# Patient Record
Sex: MALE | NOT HISPANIC OR LATINO | Employment: OTHER | URBAN - METROPOLITAN AREA
[De-identification: names, ages, dates, MRNs, and addresses within clinical notes are randomized per-mention and may not be internally consistent; named-entity substitution may affect disease eponyms.]

---

## 2023-11-16 ASSESSMENT — DERMATOLOGY QUALITY OF LIFE (QOL) ASSESSMENT
RATE HOW EMOTIONALLY BOTHERED YOU ARE BY YOUR SKIN PROBLEM (FOR EXAMPLE, WORRY, EMBARRASSMENT, FRUSTRATION): 1
WHAT SINGLE SKIN CONDITION LISTED BELOW IS THE PATIENT ANSWERING THE QUALITY-OF-LIFE ASSESSMENT QUESTIONS ABOUT: NONE OF THE ABOVE
RATE HOW BOTHERED YOU ARE BY EFFECTS OF YOUR SKIN PROBLEMS ON YOUR ACTIVITIES (EG, GOING OUT, ACCOMPLISHING WHAT YOU WANT, WORK ACTIVITIES OR YOUR RELATIONSHIPS WITH OTHERS): 2
RATE HOW EMOTIONALLY BOTHERED YOU ARE BY YOUR SKIN PROBLEM (FOR EXAMPLE, WORRY, EMBARRASSMENT, FRUSTRATION): 1
RATE HOW BOTHERED YOU ARE BY SYMPTOMS OF YOUR SKIN PROBLEM (EG, ITCHING, STINGING BURNING, HURTING OR SKIN IRRITATION): 2
RATE HOW BOTHERED YOU ARE BY SYMPTOMS OF YOUR SKIN PROBLEM (EG, ITCHING, STINGING BURNING, HURTING OR SKIN IRRITATION): 2
RATE HOW BOTHERED YOU ARE BY EFFECTS OF YOUR SKIN PROBLEMS ON YOUR ACTIVITIES (EG, GOING OUT, ACCOMPLISHING WHAT YOU WANT, WORK ACTIVITIES OR YOUR RELATIONSHIPS WITH OTHERS): 2
WHAT SINGLE SKIN CONDITION LISTED BELOW IS THE PATIENT ANSWERING THE QUALITY-OF-LIFE ASSESSMENT QUESTIONS ABOUT: NONE OF THE ABOVE

## 2023-11-20 ENCOUNTER — OFFICE VISIT (OUTPATIENT)
Dept: DERMATOLOGY | Facility: CLINIC | Age: 83
End: 2023-11-20
Payer: MEDICARE

## 2023-11-20 DIAGNOSIS — D22.5 MELANOCYTIC NEVUS OF TRUNK: ICD-10-CM

## 2023-11-20 DIAGNOSIS — L82.0 INFLAMED SEBORRHEIC KERATOSIS: ICD-10-CM

## 2023-11-20 DIAGNOSIS — L57.8 DIFFUSE PHOTODAMAGE OF SKIN: ICD-10-CM

## 2023-11-20 DIAGNOSIS — L85.3 XEROSIS CUTIS: ICD-10-CM

## 2023-11-20 DIAGNOSIS — Z85.828 HISTORY OF NONMELANOMA SKIN CANCER: ICD-10-CM

## 2023-11-20 DIAGNOSIS — L82.1 SEBORRHEIC KERATOSIS: ICD-10-CM

## 2023-11-20 DIAGNOSIS — L57.0 ACTINIC KERATOSIS: Primary | ICD-10-CM

## 2023-11-20 DIAGNOSIS — D18.01 HEMANGIOMA OF SKIN: ICD-10-CM

## 2023-11-20 PROCEDURE — 1036F TOBACCO NON-USER: CPT | Performed by: DERMATOLOGY

## 2023-11-20 PROCEDURE — 99214 OFFICE O/P EST MOD 30 MIN: CPT | Performed by: DERMATOLOGY

## 2023-11-20 PROCEDURE — 17110 DESTRUCTION B9 LES UP TO 14: CPT | Performed by: DERMATOLOGY

## 2023-11-20 PROCEDURE — 17004 DESTROY PREMAL LESIONS 15/>: CPT | Performed by: DERMATOLOGY

## 2023-11-20 PROCEDURE — 1159F MED LIST DOCD IN RCRD: CPT | Performed by: DERMATOLOGY

## 2023-11-20 RX ORDER — OMEPRAZOLE 20 MG/1
CAPSULE, DELAYED RELEASE ORAL
COMMUNITY
Start: 2022-08-08 | End: 2024-05-30 | Stop reason: ALTCHOICE

## 2023-11-20 RX ORDER — CHLORTHALIDONE 25 MG/1
12.5 TABLET ORAL
COMMUNITY
Start: 2023-02-24

## 2023-11-20 RX ORDER — ASPIRIN 81 MG/1
1 TABLET ORAL EVERY OTHER DAY
COMMUNITY
Start: 2013-09-12

## 2023-11-20 RX ORDER — FAMOTIDINE 10 MG/1
TABLET ORAL
COMMUNITY

## 2023-11-20 RX ORDER — LORATADINE 10 MG/1
10 TABLET ORAL 2 TIMES DAILY
COMMUNITY

## 2023-11-20 RX ORDER — POLYETHYLENE GLYCOL 3350 17 G/17G
POWDER, FOR SOLUTION ORAL
COMMUNITY
Start: 2022-06-22

## 2023-11-20 RX ORDER — METOPROLOL TARTRATE 50 MG/1
TABLET ORAL
COMMUNITY
Start: 2011-12-16

## 2023-11-20 RX ORDER — FLUTICASONE PROPIONATE 50 MCG
SPRAY, SUSPENSION (ML) NASAL
COMMUNITY
Start: 2020-05-26

## 2023-11-20 RX ORDER — AMMONIUM LACTATE 12 G/100G
LOTION TOPICAL AS NEEDED
Qty: 229 G | Refills: 6 | Status: SHIPPED | OUTPATIENT
Start: 2023-11-20 | End: 2024-04-22 | Stop reason: ALTCHOICE

## 2023-11-20 ASSESSMENT — ITCH NUMERIC RATING SCALE: HOW SEVERE IS YOUR ITCHING?: 0

## 2023-11-20 ASSESSMENT — PATIENT GLOBAL ASSESSMENT (PGA): PATIENT GLOBAL ASSESSMENT: PATIENT GLOBAL ASSESSMENT:  2 - MILD

## 2023-11-20 ASSESSMENT — DERMATOLOGY PATIENT ASSESSMENT
HAVE YOU HAD OR DO YOU HAVE VASCULAR DISEASE: NO
DO YOU USE A TANNING BED: NO
HAVE YOU HAD OR DO YOU HAVE A STAPH INFECTION: NO
DO YOU USE SUNSCREEN: OCCASIONALLY
ARE YOU AN ORGAN TRANSPLANT RECIPIENT: NO

## 2023-11-20 ASSESSMENT — DERMATOLOGY QUALITY OF LIFE (QOL) ASSESSMENT
RATE HOW BOTHERED YOU ARE BY EFFECTS OF YOUR SKIN PROBLEMS ON YOUR ACTIVITIES (EG, GOING OUT, ACCOMPLISHING WHAT YOU WANT, WORK ACTIVITIES OR YOUR RELATIONSHIPS WITH OTHERS): 0 - NEVER BOTHERED
DATE THE QUALITY-OF-LIFE ASSESSMENT WAS COMPLETED: 66798
RATE HOW EMOTIONALLY BOTHERED YOU ARE BY YOUR SKIN PROBLEM (FOR EXAMPLE, WORRY, EMBARRASSMENT, FRUSTRATION): 0 - NEVER BOTHERED
RATE HOW BOTHERED YOU ARE BY SYMPTOMS OF YOUR SKIN PROBLEM (EG, ITCHING, STINGING BURNING, HURTING OR SKIN IRRITATION): 0 - NEVER BOTHERED

## 2023-11-20 NOTE — PROGRESS NOTES
"Subjective     Colin Boswell is a 83 y.o. male who presents for the following: Skin Check.  He notes 4 raised, scaly bumps on his right cheek, which he reports frequently catch on his razor while shaving and get irritated and itch.  They have not changed in any other way, including in size or color, and they do not hurt or bleed.  He denies any other new, changing, or concerning skin lesions; no bleeding, itching, or burning lesions.      Review of Systems:  No other skin or systemic complaints other than what is documented elsewhere in the note.    The following portions of the chart were reviewed this encounter and updated as appropriate:       Skin Cancer History  No skin cancer on file.    Specialty Problems    None      Past Dermatologic / Past Relevant Medical History:    - history of BCC on \"inf cutaneous lip lt\" diagnosed by Dr. Darrell Lancaster on 6/18/21 s/p Mohs surgery by Dr. Zuniga on 8/17/21   - reported history of BCC on left lower leg diagnosed by Dr. Lancaster on 8/17/22 s/p radiation therapy by Dr. Lancaster in September and October 2022, according to the patient  - AKs   - no h/o melanoma    Family History:    No family history of melanoma or skin cancer    Social History:    The patient states he is retired from working as an  for [employer] and lived in [place], [place], for several years     Allergies:  Tree nuts, Amlodipine, Codeine, Banana, Lactose, Red wine extract, and Squash    Current Medications / CAM's:    Current Outpatient Medications:     aspirin 81 mg EC tablet, Take 1 tablet (81 mg) by mouth every other day., Disp: , Rfl:     chlorthalidone (Hygroton) 25 mg tablet, Take 0.5 tablets (12.5 mg) by mouth once daily., Disp: , Rfl:     fluticasone (Flonase) 50 mcg/actuation nasal spray, SHAKE LIQUID AND USE 2 SPRAYS IN EACH NOSTRIL DAILY AT BEDTIME, Disp: , Rfl:     metoprolol tartrate (Lopressor) 50 mg tablet, , Disp: , Rfl:     omeprazole (PriLOSEC) 20 mg DR capsule, , Disp: , " Rfl:     polyethylene glycol (Glycolax, Miralax) 17 gram/dose powder, Take by mouth., Disp: , Rfl:     ammonium lactate (Lac-Hydrin) 12 % lotion, Apply topically if needed for dry skin., Disp: 229 g, Rfl: 6    famotidine (Pepcid) 10 mg tablet, Take by mouth., Disp: , Rfl:     loratadine (Claritin) 10 mg tablet, Take 1 tablet (10 mg) by mouth 2 times a day., Disp: , Rfl:     vitamin D3-vitamin K2 1,250-200 mcg capsule, Take by mouth., Disp: , Rfl:      Objective   Well appearing patient in no apparent distress; mood and affect are within normal limits.    A full examination was performed including scalp, face, eyes, ears, nose, lips, neck, chest, axillae, abdomen, back, bilateral upper extremities, and bilateral lower extremities. All findings within normal limits unless otherwise noted below.    Assessment/Plan   1. Actinic keratosis (16)  Left Forehead (16)  Scattered on the patient's face, there are multiple erythematous, gritty, scaly macules     Actinic Keratoses - scattered face.  The pre-cancerous nature of these lesions and treatment options were discussed with the patient today.  At this time, we recommend treatment with liquid nitrogen cryotherapy.  The patient expressed understanding, is in agreement with this plan, and wishes to proceed with cryotherapy today.    Destr of lesion - Left Forehead  Complexity: simple    Destruction method: cryotherapy    Informed consent: discussed and consent obtained    Lesion destroyed using liquid nitrogen: Yes    Cryotherapy cycles:  1  Outcome: patient tolerated procedure well with no complications    Post-procedure details: wound care instructions given      2. Inflamed seborrheic keratosis (4)  Right Malar Cheek (2), Right Zygomatic Area (2)  On the patient's right cheek, there are 4 similar-appearing erythematous and light brown-colored, hyperkeratotic, stuck-on appearing papules each with a surrounding rim of erythema    Inflamed Seborrheic Keratoses - scattered on  right cheek.  The benign nature of these lesions was discussed with the patient today and reassurance provided.  Given the history the patient provides of frequent irritation and associated symptoms as well as their inflamed appearance on exam today, we offered to treat these 4 lesions with liquid nitrogen cryotherapy.  The patient expressed understanding, is in agreement with this plan, and wishes to proceed with cryotherapy today.    Destr of lesion - Right Malar Cheek (2), Right Zygomatic Area  Complexity: simple    Destruction method: cryotherapy    Informed consent: discussed and consent obtained    Lesion destroyed using liquid nitrogen: Yes    Cryotherapy cycles:  2  Outcome: patient tolerated procedure well with no complications    Post-procedure details: wound care instructions given      Related Medications  ammonium lactate (Lac-Hydrin) 12 % lotion  Apply topically if needed for dry skin.    3. Hemangioma of skin  Scattered on the patient's face, neck, trunk, and extremities, there are multiple small, round, cherry red- to purplish-colored, symmetric, uniform, vascular-appearing macules and papules    Cherry Angiomas - the benign nature of these vascular lesions was discussed with the patient today and reassurance provided.  No treatment is medically indicated for these lesions at this time.    4. Seborrheic keratosis  Scattered on the patient's face, neck, trunk, and extremities, there are multiple tan- to light brown-colored, hyperkeratotic, stuck-on appearing papules of varying size and shape    Seborrheic Keratoses - the benign nature of these lesions was discussed with the patient today and reassurance provided.  No treatment is medically indicated for the noninflamed SKs at this time.    5. Melanocytic nevus of trunk  Scattered on the patient's face, neck, trunk, and extremities, there are multiple small, round- to oval-shaped, brown-pigmented and pink-colored, symmetric, uniform-appearing macules  and dome-shaped papules    Clinically benign- to slightly atypical-appearing nevi - the clinically benign- to slightly atypical-appearing nature of the patient's nevi was discussed with the patient today.  None of the patient's nevi meet threshold for biopsy today.  We emphasized the importance of performing monthly self-skin exams using the ABCDs of monitoring moles, which were reviewed with the patient today, as well as the importance of sun avoidance and sun protection with daily sunscreen use.  The patient expressed understanding and is in agreement with this plan.    6. Xerosis cutis  Diffuse generalized dry, scaly skin.    Xerosis.  We emphasized the importance of dry, sensitive skin care, including the use of a mild soap, such as Dove, and frequent and aggressive moisturization, at least twice daily and immediately following showers or baths, with recommended over-the-counter moisturizing creams, such as Eucerin, Cetaphil, Cerave, or Aveeno, or Vaseline or Aquaphor ointments.  In addition, we recommend topical keratolytic therapy and moisturization with Ammonium Lactate 12% lotion, which the patient was instructed to apply twice daily to the affected areas for moisturization.  The risks, benefits, and side effects of this medication were discussed.  The patient expressed understanding and is in agreement with this plan.    7. History of nonmelanoma skin cancer  On the patient's inferior cutaneous lip left and left lower leg, there are well-healed scars with no evidence of recurrent growth on exam today.     History of basal cell carcinomas and actinic keratoses and photodamage.  There is no evidence of recurrence on exam today.  The signs and symptoms of skin cancer were reviewed and the patient was advised to practice sun protection and sun avoidance, use daily sunscreen, and perform regular self skin exams.  We will have the patient return to our office in 4-6 months for routine follow-up and skin exam, and  the patient was instructed to call our office should the patient notice any new, changing, symptomatic, or otherwise concerning skin lesions before then.  The patient expressed understanding and is in agreement with this plan.    Related Procedures  Follow Up In Dermatology - Established Patient    8. Diffuse photodamage of skin  Photodistributed  Diffuse photodamage with actinic changes with telangiectasia and mottled pigmentation in sun-exposed areas.    Photodamage.  The signs and symptoms of skin cancer were reviewed and the patient was advised to practice sun protection and sun avoidance, use daily sunscreen, and perform regular self skin exams.  Sun protection was discussed, including avoiding the mid-day sun, wearing a sunscreen with SPF at least 50, and stressing the need for reapplication of sunscreen and applying more than they think they need.      I saw and evaluated the patient. I personally obtained the key and critical portions of the history and physical exam or was physically present for key and critical portions performed by the resident/fellow. I reviewed the resident/fellow's documentation and discussed the patient with the resident/fellow. I agree with the resident/fellow's medical decision making as documented in the note.    Miguel Gallagher MD

## 2024-04-22 ENCOUNTER — LAB (OUTPATIENT)
Dept: LAB | Facility: LAB | Age: 84
End: 2024-04-22
Payer: MEDICARE

## 2024-04-22 ENCOUNTER — OFFICE VISIT (OUTPATIENT)
Dept: GASTROENTEROLOGY | Facility: CLINIC | Age: 84
End: 2024-04-22
Payer: MEDICARE

## 2024-04-22 VITALS — OXYGEN SATURATION: 97 % | HEIGHT: 67 IN | HEART RATE: 67 BPM | BODY MASS INDEX: 27.81 KG/M2 | WEIGHT: 177.2 LBS

## 2024-04-22 DIAGNOSIS — R11.0 NAUSEA: ICD-10-CM

## 2024-04-22 DIAGNOSIS — R11.0 NAUSEA: Primary | ICD-10-CM

## 2024-04-22 PROCEDURE — 36415 COLL VENOUS BLD VENIPUNCTURE: CPT

## 2024-04-22 PROCEDURE — 1159F MED LIST DOCD IN RCRD: CPT | Performed by: INTERNAL MEDICINE

## 2024-04-22 PROCEDURE — 1036F TOBACCO NON-USER: CPT | Performed by: INTERNAL MEDICINE

## 2024-04-22 PROCEDURE — 80053 COMPREHEN METABOLIC PANEL: CPT

## 2024-04-22 PROCEDURE — 99214 OFFICE O/P EST MOD 30 MIN: CPT | Performed by: INTERNAL MEDICINE

## 2024-04-22 PROCEDURE — 1160F RVW MEDS BY RX/DR IN RCRD: CPT | Performed by: INTERNAL MEDICINE

## 2024-04-22 RX ORDER — ONDANSETRON 4 MG/1
4 TABLET, FILM COATED ORAL EVERY 8 HOURS PRN
COMMUNITY
Start: 2024-04-22

## 2024-04-22 RX ORDER — ROSUVASTATIN CALCIUM 40 MG/1
40 TABLET, COATED ORAL DAILY
COMMUNITY

## 2024-04-22 RX ORDER — POTASSIUM CHLORIDE 20 MEQ/1
20 TABLET, EXTENDED RELEASE ORAL 2 TIMES DAILY
COMMUNITY
Start: 2024-03-06

## 2024-04-22 RX ORDER — FOLIC ACID 1 MG/1
TABLET ORAL
COMMUNITY

## 2024-04-22 RX ORDER — ASCORBIC ACID 500 MG
TABLET ORAL
COMMUNITY
Start: 2020-12-02

## 2024-04-22 RX ORDER — ACETAMINOPHEN 500 MG
4000 TABLET ORAL
COMMUNITY
Start: 2014-03-13

## 2024-04-22 NOTE — PROGRESS NOTES
"Subjective     History of Present Illness:   Colin Boswell is a 83 y.o. male who presents to GI clinic for week ago woke up with vomiting and diarrhea. Went to ED. Diarrhea stopped but markedly low potassium. CT okay. Has persisted with nausea since then. Off and on \"waves\" of nausea, sometimes after eating. Sleeping a lot. Has been using ondansetron with good relief. He's eating a little more but feels weak. Now feels constipated. Added Colace to his usual Miralax. Small BM last couple days.     Review of Systems  Review of Systems    Social History   reports that he quit smoking about 37 years ago. His smoking use included cigarettes. He has quit using smokeless tobacco. He reports current alcohol use of about 1.0 standard drink of alcohol per week.     Allergies  Allergies   Allergen Reactions    Tree Nuts Anaphylaxis and Shortness of breath     Since a kid    Amlodipine Swelling     Leg edema    Codeine Hallucinations and Unknown    Banana GI Upset    Lactose Unknown    Red Wine Extract GI Upset    Squash GI Upset       Medications  Current Outpatient Medications   Medication Instructions    ammonium lactate (Lac-Hydrin) 12 % lotion Topical, As needed    ascorbic acid (Vitamin C) 500 mg tablet oral    aspirin 81 mg EC tablet 1 tablet, oral, Every other day    chlorthalidone (HYGROTON) 12.5 mg, oral, Daily RT    cholecalciferol (VITAMIN D-3) 4,000 Units, oral, Daily RT    famotidine (Pepcid) 10 mg tablet oral    fluticasone (Flonase) 50 mcg/actuation nasal spray SHAKE LIQUID AND USE 2 SPRAYS IN EACH NOSTRIL DAILY AT BEDTIME    folic acid (Folvite) 1 mg tablet TAKE 1 TABLET BY MOUTH ONCE DAILY. NEEDS NEW PCP FOR FURTHER REFILLS    LACTOBACILLUS RHAMNOSUS GG ORAL oral    loratadine (CLARITIN) 10 mg, oral, 2 times daily    metoprolol tartrate (Lopressor) 50 mg tablet     omeprazole (PriLOSEC) 20 mg DR capsule     ondansetron (ZOFRAN) 4 mg, oral, Every 8 hours PRN    polyethylene glycol (Glycolax, Miralax) 17 " gram/dose powder oral    potassium chloride CR 20 mEq ER tablet 20 mEq, oral, 2 times daily    psyllium (Metamucil) 3.4 gram packet 3 capsules, oral, Daily RT    rosuvastatin (CRESTOR) 40 mg, oral, Daily    vitamin D3-vitamin K2 1,250-200 mcg capsule oral        Objective   Visit Vitals  Pulse 67      Physical Exam  Constitutional:       Appearance: Normal appearance.   HENT:      Mouth/Throat:      Mouth: Mucous membranes are moist.      Pharynx: Oropharynx is clear.   Eyes:      Extraocular Movements: Extraocular movements intact.      Pupils: Pupils are equal, round, and reactive to light.   Cardiovascular:      Rate and Rhythm: Normal rate and regular rhythm.      Heart sounds: No murmur heard.  Pulmonary:      Effort: Pulmonary effort is normal.      Breath sounds: Normal breath sounds.   Abdominal:      General: Abdomen is flat. Bowel sounds are normal.      Palpations: Abdomen is soft. There is no mass.      Tenderness: There is abdominal tenderness (mild LLQ tenderness).   Skin:     General: Skin is warm and dry.   Neurological:      Mental Status: He is alert.   Psychiatric:         Mood and Affect: Mood normal.         Behavior: Behavior normal.         Thought Content: Thought content normal.         Judgment: Judgment normal.               Assessment/Plan   Colin Boswell is a 83 y.o. male who presents to GI clinic for persistent nausea after what sounds like a viral gastroenteritis. Some constipation now, likely from several days not eating. Unlikely persistent infection, electrolyte abnormalities.    Plan:  CMP  Continue advancing diet  Let me know if symptoms fail to fully resolve over next week to 10 days.      Shaq Ontiveros MD

## 2024-04-23 DIAGNOSIS — E87.6 HYPOKALEMIA: Primary | ICD-10-CM

## 2024-04-23 DIAGNOSIS — R74.8 ABNORMAL LIVER ENZYMES: ICD-10-CM

## 2024-04-23 LAB
ALBUMIN SERPL BCP-MCNC: 4.4 G/DL (ref 3.4–5)
ALP SERPL-CCNC: 58 U/L (ref 33–136)
ALT SERPL W P-5'-P-CCNC: 45 U/L (ref 10–52)
ANION GAP SERPL CALC-SCNC: 15 MMOL/L (ref 10–20)
AST SERPL W P-5'-P-CCNC: 58 U/L (ref 9–39)
BILIRUB SERPL-MCNC: 0.6 MG/DL (ref 0–1.2)
BUN SERPL-MCNC: 20 MG/DL (ref 6–23)
CALCIUM SERPL-MCNC: 9.6 MG/DL (ref 8.6–10.6)
CHLORIDE SERPL-SCNC: 99 MMOL/L (ref 98–107)
CO2 SERPL-SCNC: 30 MMOL/L (ref 21–32)
CREAT SERPL-MCNC: 1.29 MG/DL (ref 0.5–1.3)
EGFRCR SERPLBLD CKD-EPI 2021: 55 ML/MIN/1.73M*2
GLUCOSE SERPL-MCNC: 90 MG/DL (ref 74–99)
POTASSIUM SERPL-SCNC: 3.9 MMOL/L (ref 3.5–5.3)
PROT SERPL-MCNC: 7.3 G/DL (ref 6.4–8.2)
SODIUM SERPL-SCNC: 140 MMOL/L (ref 136–145)

## 2024-04-23 NOTE — RESULT ENCOUNTER NOTE
Creatinine up slightly likely consistent with a dehydration of this recent illness.  Will manage expectantly.

## 2024-05-08 ENCOUNTER — LAB (OUTPATIENT)
Dept: LAB | Facility: LAB | Age: 84
End: 2024-05-08
Payer: MEDICARE

## 2024-05-08 DIAGNOSIS — R74.8 ABNORMAL LIVER ENZYMES: ICD-10-CM

## 2024-05-08 DIAGNOSIS — R79.89 ABNORMAL LFTS: ICD-10-CM

## 2024-05-08 DIAGNOSIS — E87.6 HYPOKALEMIA: ICD-10-CM

## 2024-05-08 PROCEDURE — 86705 HEP B CORE ANTIBODY IGM: CPT

## 2024-05-08 PROCEDURE — 87340 HEPATITIS B SURFACE AG IA: CPT

## 2024-05-08 PROCEDURE — 86803 HEPATITIS C AB TEST: CPT

## 2024-05-08 PROCEDURE — 36415 COLL VENOUS BLD VENIPUNCTURE: CPT

## 2024-05-08 PROCEDURE — 80053 COMPREHEN METABOLIC PANEL: CPT

## 2024-05-09 DIAGNOSIS — R79.89 ABNORMAL LFTS: Primary | ICD-10-CM

## 2024-05-09 LAB
ALBUMIN SERPL BCP-MCNC: 4.2 G/DL (ref 3.4–5)
ALP SERPL-CCNC: 49 U/L (ref 33–136)
ALT SERPL W P-5'-P-CCNC: 32 U/L (ref 10–52)
ANION GAP SERPL CALC-SCNC: 13 MMOL/L (ref 10–20)
AST SERPL W P-5'-P-CCNC: 41 U/L (ref 9–39)
BILIRUB SERPL-MCNC: 0.6 MG/DL (ref 0–1.2)
BUN SERPL-MCNC: 20 MG/DL (ref 6–23)
CALCIUM SERPL-MCNC: 9.1 MG/DL (ref 8.6–10.6)
CHLORIDE SERPL-SCNC: 102 MMOL/L (ref 98–107)
CO2 SERPL-SCNC: 29 MMOL/L (ref 21–32)
CREAT SERPL-MCNC: 1.18 MG/DL (ref 0.5–1.3)
EGFRCR SERPLBLD CKD-EPI 2021: 61 ML/MIN/1.73M*2
GLUCOSE SERPL-MCNC: 84 MG/DL (ref 74–99)
HBV CORE IGM SER QL: NONREACTIVE
HBV SURFACE AG SERPL QL IA: NONREACTIVE
HCV AB SER QL: NONREACTIVE
POTASSIUM SERPL-SCNC: 4.1 MMOL/L (ref 3.5–5.3)
PROT SERPL-MCNC: 7 G/DL (ref 6.4–8.2)
SODIUM SERPL-SCNC: 140 MMOL/L (ref 136–145)

## 2024-05-09 NOTE — RESULT ENCOUNTER NOTE
Still with some postprandial nausea and bloating.  Feels he's slowly improving but not back to normal.  Will check HBsAg, HBcAb, HCV antibody, CMV IgM.  Recheck LFT 2 weeks.

## 2024-05-20 ENCOUNTER — OFFICE VISIT (OUTPATIENT)
Dept: DERMATOLOGY | Facility: CLINIC | Age: 84
End: 2024-05-20
Payer: MEDICARE

## 2024-05-20 DIAGNOSIS — D22.9 MELANOCYTIC NEVUS, UNSPECIFIED LOCATION: ICD-10-CM

## 2024-05-20 DIAGNOSIS — L82.0 INFLAMED SEBORRHEIC KERATOSIS: ICD-10-CM

## 2024-05-20 DIAGNOSIS — Z85.828 HISTORY OF NONMELANOMA SKIN CANCER: ICD-10-CM

## 2024-05-20 DIAGNOSIS — L82.1 SEBORRHEIC KERATOSIS: ICD-10-CM

## 2024-05-20 DIAGNOSIS — L73.9 FOLLICULITIS: ICD-10-CM

## 2024-05-20 DIAGNOSIS — L57.8 DIFFUSE PHOTODAMAGE OF SKIN: ICD-10-CM

## 2024-05-20 DIAGNOSIS — L81.4 LENTIGO: ICD-10-CM

## 2024-05-20 DIAGNOSIS — D48.5 NEOPLASM OF UNCERTAIN BEHAVIOR OF SKIN: Primary | ICD-10-CM

## 2024-05-20 DIAGNOSIS — L57.0 ACTINIC KERATOSIS: ICD-10-CM

## 2024-05-20 PROCEDURE — 17004 DESTROY PREMAL LESIONS 15/>: CPT | Performed by: DERMATOLOGY

## 2024-05-20 PROCEDURE — 99214 OFFICE O/P EST MOD 30 MIN: CPT | Performed by: DERMATOLOGY

## 2024-05-20 PROCEDURE — 1160F RVW MEDS BY RX/DR IN RCRD: CPT | Performed by: DERMATOLOGY

## 2024-05-20 PROCEDURE — 11301 SHAVE SKIN LESION 0.6-1.0 CM: CPT | Performed by: DERMATOLOGY

## 2024-05-20 PROCEDURE — 1159F MED LIST DOCD IN RCRD: CPT | Performed by: DERMATOLOGY

## 2024-05-20 PROCEDURE — 17110 DESTRUCTION B9 LES UP TO 14: CPT | Performed by: DERMATOLOGY

## 2024-05-20 RX ORDER — CLINDAMYCIN PHOSPHATE 10 UG/ML
LOTION TOPICAL DAILY
Qty: 60 ML | Refills: 11 | Status: CANCELLED | OUTPATIENT
Start: 2024-05-20 | End: 2025-05-20

## 2024-05-20 RX ORDER — CLINDAMYCIN PHOSPHATE 10 UG/ML
LOTION TOPICAL DAILY
Qty: 60 ML | Refills: 11 | Status: SHIPPED | OUTPATIENT
Start: 2024-05-20 | End: 2025-05-20

## 2024-05-20 ASSESSMENT — DERMATOLOGY QUALITY OF LIFE (QOL) ASSESSMENT
RATE HOW EMOTIONALLY BOTHERED YOU ARE BY YOUR SKIN PROBLEM (FOR EXAMPLE, WORRY, EMBARRASSMENT, FRUSTRATION): 0 - NEVER BOTHERED
RATE HOW BOTHERED YOU ARE BY SYMPTOMS OF YOUR SKIN PROBLEM (EG, ITCHING, STINGING BURNING, HURTING OR SKIN IRRITATION): 1
RATE HOW BOTHERED YOU ARE BY EFFECTS OF YOUR SKIN PROBLEMS ON YOUR ACTIVITIES (EG, GOING OUT, ACCOMPLISHING WHAT YOU WANT, WORK ACTIVITIES OR YOUR RELATIONSHIPS WITH OTHERS): 0 - NEVER BOTHERED
RATE HOW EMOTIONALLY BOTHERED YOU ARE BY YOUR SKIN PROBLEM (FOR EXAMPLE, WORRY, EMBARRASSMENT, FRUSTRATION): 0 - NEVER BOTHERED
RATE HOW BOTHERED YOU ARE BY EFFECTS OF YOUR SKIN PROBLEMS ON YOUR ACTIVITIES (EG, GOING OUT, ACCOMPLISHING WHAT YOU WANT, WORK ACTIVITIES OR YOUR RELATIONSHIPS WITH OTHERS): 0 - NEVER BOTHERED
RATE HOW BOTHERED YOU ARE BY SYMPTOMS OF YOUR SKIN PROBLEM (EG, ITCHING, STINGING BURNING, HURTING OR SKIN IRRITATION): 1

## 2024-05-20 ASSESSMENT — PATIENT GLOBAL ASSESSMENT (PGA): WHAT IS THE PGA: PATIENT GLOBAL ASSESSMENT:  1 - CLEAR

## 2024-05-20 NOTE — PROGRESS NOTES
"Subjective     Colin Boswell is a 83 y.o. male who presents for the following: Skin Exam.  He notes a raised, scaly bump on the back right side of his neck, which has been itching recently, especially when it catches on his necklace.  It has not changed in any other way, including in size, shape, or color, and it does not hurt or bleed.  He also notes recent pimple breakouts on his abdomen.  He denies any other new, changing, or concerning skin lesions since his last visit; no bleeding, itching, or burning lesions.      Review of Systems:  No other skin or systemic complaints other than what is documented elsewhere in the note.    The following portions of the chart were reviewed this encounter and updated as appropriate:       Skin Cancer History  No skin cancer on file.    Specialty Problems    None      Past Dermatologic / Past Relevant Medical History:    - history of BCC on \"inf cutaneous lip lt\" diagnosed by Dr. Darrell Lancaster on 6/18/21 s/p Mohs surgery by Dr. Zuniga on 8/17/21   - reported history of BCC on left lower leg diagnosed by Dr. Lancaster on 8/17/22 s/p radiation therapy by Dr. Lancaster in September and October 2022, according to the patient  - AKs   - no h/o melanoma    Family History:    No family history of melanoma or skin cancer    Social History:    The patient states he is retired from working as an  for [employer] and lived in [place], [place], for several years     Allergies:  Tree nut, Tree nuts, Sulfamethoxazole-trimethoprim, Acetaminophen, Amlodipine, Ciprofloxacin, Codeine, Morphine, Oxycodone-acetaminophen, Propoxyphene, Propoxyphene n-acetaminophen, Banana, Lactose, Levofloxacin, Red wine extract, and Squash    Current Medications / CAM's:    Current Outpatient Medications:     ascorbic acid (Vitamin C) 500 mg tablet, Take by mouth., Disp: , Rfl:     aspirin 81 mg EC tablet, Take 1 tablet (81 mg) by mouth every other day., Disp: , Rfl:     chlorthalidone (Hygroton) 25 mg " tablet, Take 0.5 tablets (12.5 mg) by mouth once daily., Disp: , Rfl:     cholecalciferol (Vitamin D-3) 50 mcg (2,000 unit) capsule, Take 2 capsules (100 mcg) by mouth once daily., Disp: , Rfl:     clindamycin (Cleocin T) 1 % lotion, Apply topically once daily. 60g, Disp: 60 mL, Rfl: 11    famotidine (Pepcid) 10 mg tablet, Take by mouth., Disp: , Rfl:     fluticasone (Flonase) 50 mcg/actuation nasal spray, SHAKE LIQUID AND USE 2 SPRAYS IN EACH NOSTRIL DAILY AT BEDTIME, Disp: , Rfl:     folic acid (Folvite) 1 mg tablet, TAKE 1 TABLET BY MOUTH ONCE DAILY. NEEDS NEW PCP FOR FURTHER REFILLS, Disp: , Rfl:     LACTOBACILLUS RHAMNOSUS GG ORAL, Take by mouth., Disp: , Rfl:     loratadine (Claritin) 10 mg tablet, Take 1 tablet (10 mg) by mouth 2 times a day., Disp: , Rfl:     metoprolol tartrate (Lopressor) 50 mg tablet, , Disp: , Rfl:     omeprazole (PriLOSEC) 20 mg DR capsule, , Disp: , Rfl:     ondansetron (Zofran) 4 mg tablet, Take 1 tablet (4 mg) by mouth every 8 hours if needed., Disp: , Rfl:     polyethylene glycol (Glycolax, Miralax) 17 gram/dose powder, Take by mouth., Disp: , Rfl:     potassium chloride CR 20 mEq ER tablet, Take 1 tablet (20 mEq) by mouth twice a day., Disp: , Rfl:     psyllium (Metamucil) 3.4 gram packet, Take 3 capsules by mouth once daily., Disp: , Rfl:     rosuvastatin (Crestor) 40 mg tablet, Take 1 tablet (40 mg) by mouth once daily., Disp: , Rfl:     vitamin D3-vitamin K2 1,250-200 mcg capsule, Take by mouth., Disp: , Rfl:      Objective   Well appearing patient in no apparent distress; mood and affect are within normal limits.    A full examination was performed including scalp, face, eyes, ears, nose, lips, neck, chest, axillae, abdomen, back, bilateral upper extremities, and bilateral lower extremities. All findings within normal limits unless otherwise noted below.    Assessment/Plan   1. Neoplasm of uncertain behavior of skin  Right posterior mid arm  5 mm dark brown pigmented,  asymmetric macule with an asymmetric pigment network and irregular borders               Shave removal    Lesion length (cm):  0.5  Lesion width (cm):  0.5  Margin per side (cm):  0.2  Lesion diameter (cm):  0.9  Informed consent: discussed and consent obtained    Timeout: patient name, date of birth, surgical site, and procedure verified    Procedure prep:  Patient was prepped and draped  Anesthesia: the lesion was anesthetized in a standard fashion    Anesthetic:  1% lidocaine w/ epinephrine 1-100,000 local infiltration  Instrument used: flexible razor blade    Hemostasis achieved with: aluminum chloride    Outcome: patient tolerated procedure well    Post-procedure details: sterile dressing applied and wound care instructions given    Dressing type: bandage and petrolatum      Specimen 1 - Dermatopathology- DERM LAB  Differential Diagnosis: r/o DN  Check Margins Yes/No?:    Comments:    Dermpath Lab: Routine Histopathology (formalin-fixed tissue)    2. Inflamed seborrheic keratosis  Neck - Posterior  On the patient's right posterior inferior neck, there is a 1 cm erythematous and light brown-colored, hyperkeratotic, stuck-on appearing papule with a surrounding rim of erythema    Inflamed Seborrheic Keratosis - right posterior inferior neck.  The benign nature of this lesion was discussed with the patient today and reassurance provided.  Given the history the patient provides of frequent irritation and associated symptoms as well as its inflamed appearance on exam today, we offered to treat this lesion with liquid nitrogen cryotherapy.  The patient expressed understanding, is in agreement with this plan, and wishes to proceed with cryotherapy today.    Destr of lesion - Neck - Posterior  Complexity: simple    Destruction method: cryotherapy    Informed consent: discussed and consent obtained    Lesion destroyed using liquid nitrogen: Yes    Cryotherapy cycles:  2  Outcome: patient tolerated procedure well with no  complications    Post-procedure details: wound care instructions given      3. Actinic keratosis (16)  Head - Anterior (Face) (16)  Scattered on the patient's face and bilateral ears, there are multiple erythematous, gritty, scaly macules     Actinic Keratoses - scattered on face and bilateral ears.  The pre-cancerous nature of these lesions and treatment options were discussed with the patient today.  At this time, we recommend treatment with liquid nitrogen cryotherapy.  The patient expressed understanding, is in agreement with this plan, and wishes to proceed with cryotherapy today.    Destr of lesion - Head - Anterior (Face)  Complexity: simple    Destruction method: cryotherapy    Informed consent: discussed and consent obtained    Lesion destroyed using liquid nitrogen: Yes    Cryotherapy cycles:  1  Outcome: patient tolerated procedure well with no complications    Post-procedure details: wound care instructions given      4. Folliculitis  Right Breast  Scattered on the patient's abdomen, there are several follicular-based erythematous, inflammatory papules and pustules    Folliculitis -flare on abdomen.  The bacterial nature of this condition and treatment options were discussed with the patient today.  At this time, we recommend topical antibiotic therapy with Clindamycin 1% lotion, which the patient was instructed to apply twice daily to the affected areas or up to 3-4 times per day as needed for active lesions.  The risks, benefits, and side effects of this medication were discussed.  The patient expressed understanding and is in agreement with this plan.    clindamycin (Cleocin T) 1 % lotion - Right Breast  Apply topically once daily. 60g    5. Melanocytic nevus, unspecified location  Scattered on the patient's face, neck, trunk, and extremities, there are multiple small, round- to oval-shaped, brown-pigmented and pink-colored, symmetric, uniform-appearing macules and dome-shaped papules    Clinically  benign- to slightly atypical-appearing nevi - the clinically benign- to slightly atypical-appearing nature of the patient's nevi was discussed with the patient today.  None of the patient's nevi, with the exception of the 1 noted above, meet threshold for biopsy today.  We emphasized the importance of performing monthly self-skin exams using the ABCDs of monitoring moles, which were reviewed with the patient today and an informational hand-out provided.  We also emphasized the importance of sun avoidance and sun protection with daily sunscreen use.  The patient expressed understanding and is in agreement with this plan.    6. Lentigo  Multiple tan- to light brown-colored, round- to oval-shaped, symmetric and uniform-appearing macules and small patches consistent with lentigines scattered in sun-exposed areas.    Solar Lentigines and photodamage.  The clinically benign-appearing nature of these lesions and their relation to chronic sun exposure were discussed with the patient today and reassurance provided.  None of these lesions meet threshold for biopsy today, and thus no treatment is medically indicated for these lesions at this time.  The signs and symptoms of skin cancer were reviewed and the patient was advised to practice sun protection and sun avoidance, use daily sunscreen, and perform regular self skin exams.  The patient was instructed to monitor these lesions for any changes, such as in size, shape, or color, or associated symptoms and to call our office to schedule a return visit for re-evaluation if any such changes or symptoms are noticed in the future.  The patient expressed understanding and is in agreement with this plan.    7. Seborrheic keratosis  Scattered on the patient's face, neck, trunk, and extremities, there are multiple tan- to light brown-colored, hyperkeratotic, stuck-on appearing papules of varying size and shape    Seborrheic Keratoses - the benign nature of these lesions was discussed  with the patient today and reassurance provided.  No treatment is medically indicated for the non-inflamed SKs at this time.    8. History of nonmelanoma skin cancer  On the patient's inferior cutaneous lip lt and left lower leg, there are well-healed scars with no evidence of recurrent growth on exam today.    History of basal cell carcinomas and actinic keratoses and photodamage.  There is no evidence of recurrence on exam today.  The signs and symptoms of skin cancer were reviewed and the patient was advised to practice sun protection and sun avoidance, use daily sunscreen, and perform regular self skin exams.  We will have the patient return to our office in 4-6 months, pending the above biopsy result, for routine follow-up and skin exam, and the patient was instructed to call our office should the patient notice any new, changing, symptomatic, or otherwise concerning skin lesions before then.  The patient expressed understanding and is in agreement with this plan.    Related Procedures  Follow Up In Dermatology - Established Patient  Follow Up In Dermatology - Established Patient    9. Diffuse photodamage of skin  Diffuse photodamage with actinic changes with telangiectasia and mottled pigmentation in sun-exposed areas.    Photodamage.  The signs and symptoms of skin cancer were reviewed and the patient was advised to practice sun protection and sun avoidance, use daily sunscreen, and perform regular self skin exams.  Sun protection was discussed, including avoiding the mid-day sun, wearing a sunscreen with SPF at least 50, and stressing the need for reapplication of sunscreen and applying more than they think they need.        Sandy Cavazos MD      I saw and evaluated the patient. I personally obtained the key and critical portions of the history and physical exam or was physically present for key and critical portions performed by the resident/fellow. I reviewed the resident/fellow's documentation and  discussed the patient with the resident/fellow. I agree with the resident/fellow's medical decision making as documented in the note.    Miguel Gallagher MD

## 2024-05-22 LAB
LABORATORY COMMENT REPORT: NORMAL
PATH REPORT.FINAL DX SPEC: NORMAL
PATH REPORT.GROSS SPEC: NORMAL
PATH REPORT.MICROSCOPIC SPEC OTHER STN: NORMAL
PATH REPORT.RELEVANT HX SPEC: NORMAL
PATH REPORT.TOTAL CANCER: NORMAL

## 2024-05-23 ENCOUNTER — LAB (OUTPATIENT)
Dept: LAB | Facility: LAB | Age: 84
End: 2024-05-23
Payer: MEDICARE

## 2024-05-23 DIAGNOSIS — R79.89 ABNORMAL LFTS: ICD-10-CM

## 2024-05-23 DIAGNOSIS — R74.8 ABNORMAL LIVER ENZYMES: Primary | ICD-10-CM

## 2024-05-23 DIAGNOSIS — R74.8 ABNORMAL LIVER ENZYMES: ICD-10-CM

## 2024-05-23 LAB
A1AT SERPL NEPH-MCNC: 170 MG/DL (ref 84–218)
ALBUMIN SERPL BCP-MCNC: 4 G/DL (ref 3.4–5)
ALP SERPL-CCNC: 53 U/L (ref 33–136)
ALT SERPL W P-5'-P-CCNC: 29 U/L (ref 10–52)
ANION GAP SERPL CALC-SCNC: 13 MMOL/L (ref 10–20)
AST SERPL W P-5'-P-CCNC: 42 U/L (ref 9–39)
BILIRUB SERPL-MCNC: 0.6 MG/DL (ref 0–1.2)
BUN SERPL-MCNC: 18 MG/DL (ref 6–23)
CALCIUM SERPL-MCNC: 9.3 MG/DL (ref 8.6–10.6)
CHLORIDE SERPL-SCNC: 102 MMOL/L (ref 98–107)
CO2 SERPL-SCNC: 30 MMOL/L (ref 21–32)
CREAT SERPL-MCNC: 1.08 MG/DL (ref 0.5–1.3)
EGFRCR SERPLBLD CKD-EPI 2021: 68 ML/MIN/1.73M*2
FERRITIN SERPL-MCNC: 82 NG/ML (ref 20–300)
GLUCOSE SERPL-MCNC: 105 MG/DL (ref 74–99)
IRON SATN MFR SERPL: 25 % (ref 25–45)
IRON SERPL-MCNC: 96 UG/DL (ref 35–150)
POTASSIUM SERPL-SCNC: 3.5 MMOL/L (ref 3.5–5.3)
PROT SERPL-MCNC: 7.2 G/DL (ref 6.4–8.2)
SODIUM SERPL-SCNC: 141 MMOL/L (ref 136–145)
TIBC SERPL-MCNC: 381 UG/DL (ref 240–445)
UIBC SERPL-MCNC: 285 UG/DL (ref 110–370)

## 2024-05-23 PROCEDURE — 86645 CMV ANTIBODY IGM: CPT

## 2024-05-23 PROCEDURE — 86038 ANTINUCLEAR ANTIBODIES: CPT

## 2024-05-23 PROCEDURE — 86381 MITOCHONDRIAL ANTIBODY EACH: CPT

## 2024-05-23 PROCEDURE — 83550 IRON BINDING TEST: CPT

## 2024-05-23 PROCEDURE — 86015 ACTIN ANTIBODY EACH: CPT

## 2024-05-23 PROCEDURE — 82103 ALPHA-1-ANTITRYPSIN TOTAL: CPT

## 2024-05-23 PROCEDURE — 36415 COLL VENOUS BLD VENIPUNCTURE: CPT

## 2024-05-23 PROCEDURE — 83540 ASSAY OF IRON: CPT

## 2024-05-23 PROCEDURE — 80053 COMPREHEN METABOLIC PANEL: CPT

## 2024-05-23 PROCEDURE — 82728 ASSAY OF FERRITIN: CPT

## 2024-05-26 LAB — CMV IGM SERPL-ACNC: <8 AU/ML

## 2024-05-28 LAB
ANA SER QL HEP2 SUBST: NEGATIVE
MITOCHONDRIA AB SER QL IF: NEGATIVE
SMOOTH MUSCLE AB SER QL IF: NEGATIVE

## 2024-05-30 ENCOUNTER — OFFICE VISIT (OUTPATIENT)
Dept: GASTROENTEROLOGY | Facility: CLINIC | Age: 84
End: 2024-05-30
Payer: MEDICARE

## 2024-05-30 VITALS — OXYGEN SATURATION: 98 % | WEIGHT: 177 LBS | HEART RATE: 59 BPM | BODY MASS INDEX: 27.78 KG/M2 | HEIGHT: 67 IN

## 2024-05-30 DIAGNOSIS — R74.8 ABNORMAL LIVER ENZYMES: Primary | ICD-10-CM

## 2024-05-30 DIAGNOSIS — R19.5 CHANGE IN STOOL: ICD-10-CM

## 2024-05-30 PROCEDURE — 1160F RVW MEDS BY RX/DR IN RCRD: CPT | Performed by: INTERNAL MEDICINE

## 2024-05-30 PROCEDURE — 1036F TOBACCO NON-USER: CPT | Performed by: INTERNAL MEDICINE

## 2024-05-30 PROCEDURE — 1159F MED LIST DOCD IN RCRD: CPT | Performed by: INTERNAL MEDICINE

## 2024-05-30 PROCEDURE — 99214 OFFICE O/P EST MOD 30 MIN: CPT | Performed by: INTERNAL MEDICINE

## 2024-05-30 NOTE — PROGRESS NOTES
"Subjective     History of Present Illness:   Colin Boswell is a 83 y.o. male who presents to GI clinic for nausea is now resolved. Was able to stop the omeprazole.   BM now okay taking both the Miralax and a stool softener.  Still sometimes some \"indigestion\" after a BM that is brief and transient.    Occasional \"acid reflux\" after tomato sauce. None for the last couple weeks.    Rare episodes of feeling food doesn't go down when he tries to swallow and then goes down with a sip of water. Then no problem for weeks.    Review of Systems  Review of Systems    Social History   reports that he quit smoking about 37 years ago. His smoking use included cigarettes. He has quit using smokeless tobacco. He reports current alcohol use of about 1.0 standard drink of alcohol per week.     Allergies  Allergies   Allergen Reactions    Tree Nut Anaphylaxis    Tree Nuts Anaphylaxis and Shortness of breath     Since a kid    Sulfamethoxazole-Trimethoprim GI Upset    Acetaminophen Unknown    Amlodipine Swelling     Leg edema    Ciprofloxacin GI Upset    Codeine Hallucinations and Unknown    Morphine Unknown    Oxycodone-Acetaminophen Hallucinations    Propoxyphene Unknown    Propoxyphene N-Acetaminophen Unknown    Banana GI Upset    Lactose Unknown    Levofloxacin GI Upset    Red Wine Extract GI Upset    Squash GI Upset       Medications  Current Outpatient Medications   Medication Instructions    ascorbic acid (Vitamin C) 500 mg tablet oral    aspirin 81 mg EC tablet 1 tablet, oral, Every other day    chlorthalidone (HYGROTON) 12.5 mg, oral, Daily RT    cholecalciferol (VITAMIN D-3) 4,000 Units, oral, Daily RT    clindamycin (Cleocin T) 1 % lotion Topical, Daily, 60g    famotidine (Pepcid) 10 mg tablet oral    fluticasone (Flonase) 50 mcg/actuation nasal spray SHAKE LIQUID AND USE 2 SPRAYS IN EACH NOSTRIL DAILY AT BEDTIME    folic acid (Folvite) 1 mg tablet TAKE 1 TABLET BY MOUTH ONCE DAILY. NEEDS NEW PCP FOR FURTHER REFILLS    " "LACTOBACILLUS RHAMNOSUS GG ORAL oral    loratadine (CLARITIN) 10 mg, oral, 2 times daily    metoprolol tartrate (Lopressor) 50 mg tablet     omeprazole (PriLOSEC) 20 mg DR capsule     ondansetron (ZOFRAN) 4 mg, oral, Every 8 hours PRN    polyethylene glycol (Glycolax, Miralax) 17 gram/dose powder oral    potassium chloride CR 20 mEq ER tablet 20 mEq, oral, 2 times daily    psyllium (Metamucil) 3.4 gram packet 3 capsules, oral, Daily RT    rosuvastatin (CRESTOR) 40 mg, oral, Daily    vitamin D3-vitamin K2 1,250-200 mcg capsule oral        Objective   Visit Vitals  Pulse 59      Physical Exam  Constitutional:       Appearance: Normal appearance. He is normal weight.   HENT:      Head: Normocephalic and atraumatic.      Mouth/Throat:      Mouth: Mucous membranes are dry.      Pharynx: Oropharynx is clear.   Eyes:      Extraocular Movements: Extraocular movements intact.      Pupils: Pupils are equal, round, and reactive to light.   Neurological:      General: No focal deficit present.      Mental Status: He is alert.   Psychiatric:         Mood and Affect: Mood normal.         Thought Content: Thought content normal.         Judgment: Judgment normal.                     No lab exists for component: \"LABALBU\"        Assessment/Plan   Colin Boswell is a 83 y.o. male who presents to GI clinic for   Resolving post-infectious IBS.  Slightly elevated AST with negative serologies.  Mild intermittent dysphagia. ? Motility. ? Zenkers's    Plan:  Let me know in a month.  Will recheck LFT  If still elevated will do U/S liver  If still any episodes dysphagia will do barium swallow..      Shaq Ontiveros MD         "

## 2024-06-27 ENCOUNTER — LAB (OUTPATIENT)
Dept: LAB | Facility: LAB | Age: 84
End: 2024-06-27
Payer: MEDICARE

## 2024-06-27 DIAGNOSIS — R74.8 ABNORMAL LIVER ENZYMES: ICD-10-CM

## 2024-06-27 LAB
ALBUMIN SERPL BCP-MCNC: 4.2 G/DL (ref 3.4–5)
ALP SERPL-CCNC: 52 U/L (ref 33–136)
ALT SERPL W P-5'-P-CCNC: 26 U/L (ref 10–52)
ANION GAP SERPL CALC-SCNC: 15 MMOL/L (ref 10–20)
AST SERPL W P-5'-P-CCNC: 34 U/L (ref 9–39)
BILIRUB SERPL-MCNC: 0.6 MG/DL (ref 0–1.2)
BUN SERPL-MCNC: 17 MG/DL (ref 6–23)
CALCIUM SERPL-MCNC: 9.4 MG/DL (ref 8.6–10.6)
CHLORIDE SERPL-SCNC: 101 MMOL/L (ref 98–107)
CO2 SERPL-SCNC: 30 MMOL/L (ref 21–32)
CREAT SERPL-MCNC: 1.05 MG/DL (ref 0.5–1.3)
EGFRCR SERPLBLD CKD-EPI 2021: 70 ML/MIN/1.73M*2
GLUCOSE SERPL-MCNC: 93 MG/DL (ref 74–99)
POTASSIUM SERPL-SCNC: 3.5 MMOL/L (ref 3.5–5.3)
PROT SERPL-MCNC: 7.3 G/DL (ref 6.4–8.2)
SODIUM SERPL-SCNC: 142 MMOL/L (ref 136–145)

## 2024-06-27 PROCEDURE — 80053 COMPREHEN METABOLIC PANEL: CPT

## 2024-06-27 PROCEDURE — 36415 COLL VENOUS BLD VENIPUNCTURE: CPT

## 2024-07-01 ENCOUNTER — APPOINTMENT (OUTPATIENT)
Dept: GASTROENTEROLOGY | Facility: CLINIC | Age: 84
End: 2024-07-01
Payer: MEDICARE

## 2024-07-01 VITALS — HEART RATE: 60 BPM | HEIGHT: 67 IN | BODY MASS INDEX: 27.18 KG/M2 | WEIGHT: 173.2 LBS | OXYGEN SATURATION: 97 %

## 2024-07-01 DIAGNOSIS — R11.0 NAUSEA: Primary | ICD-10-CM

## 2024-07-01 DIAGNOSIS — R19.5 CHANGE IN STOOL: ICD-10-CM

## 2024-07-01 PROBLEM — R74.8 ABNORMAL LIVER ENZYMES: Status: RESOLVED | Noted: 2024-05-30 | Resolved: 2024-07-01

## 2024-07-01 PROCEDURE — 1160F RVW MEDS BY RX/DR IN RCRD: CPT | Performed by: INTERNAL MEDICINE

## 2024-07-01 PROCEDURE — 1159F MED LIST DOCD IN RCRD: CPT | Performed by: INTERNAL MEDICINE

## 2024-07-01 PROCEDURE — 99214 OFFICE O/P EST MOD 30 MIN: CPT | Performed by: INTERNAL MEDICINE

## 2024-07-01 PROCEDURE — 1036F TOBACCO NON-USER: CPT | Performed by: INTERNAL MEDICINE

## 2024-07-01 NOTE — PROGRESS NOTES
Subjective     History of Present Illness:   Colin Boswell is a 83 y.o. male who presents to GI clinic for continued through early June with off and on abd and mid back discomfort promptly relieved by Pepcid.  BM's generally normal.  Sleeping well.   Still some off and on nausea after eating.  Generally good for the last 5-6 days except for some mid epigastric discomfort yesterday relieved by Pepcid.  .    Review of Systems  Review of Systems    Social History   reports that he quit smoking about 37 years ago. His smoking use included cigarettes. He has quit using smokeless tobacco. He reports current alcohol use of about 1.0 standard drink of alcohol per week.     Allergies  Allergies   Allergen Reactions    Tree Nut Anaphylaxis    Tree Nuts Anaphylaxis and Shortness of breath     Since a kid    Sulfamethoxazole-Trimethoprim GI Upset    Acetaminophen Unknown    Amlodipine Swelling     Leg edema    Ciprofloxacin GI Upset    Codeine Hallucinations and Unknown    Morphine Unknown    Oxycodone-Acetaminophen Hallucinations    Propoxyphene Unknown    Propoxyphene N-Acetaminophen Unknown    Banana GI Upset    Lactose Unknown    Levofloxacin GI Upset    Red Wine Extract GI Upset    Squash GI Upset       Medications  Current Outpatient Medications   Medication Instructions    ascorbic acid (Vitamin C) 500 mg tablet oral    aspirin 81 mg EC tablet 1 tablet, oral, Every other day    chlorthalidone (HYGROTON) 12.5 mg, oral, Daily RT    cholecalciferol (VITAMIN D-3) 4,000 Units, oral, Daily RT    clindamycin (Cleocin T) 1 % lotion Topical, Daily, 60g    famotidine (Pepcid) 10 mg tablet oral    fluticasone (Flonase) 50 mcg/actuation nasal spray SHAKE LIQUID AND USE 2 SPRAYS IN EACH NOSTRIL DAILY AT BEDTIME    folic acid (Folvite) 1 mg tablet TAKE 1 TABLET BY MOUTH ONCE DAILY. NEEDS NEW PCP FOR FURTHER REFILLS    LACTOBACILLUS RHAMNOSUS GG ORAL oral    loratadine (CLARITIN) 10 mg, oral, 2 times daily    metoprolol tartrate  (Lopressor) 50 mg tablet     ondansetron (ZOFRAN) 4 mg, oral, Every 8 hours PRN    polyethylene glycol (Glycolax, Miralax) 17 gram/dose powder oral    potassium chloride CR 20 mEq ER tablet 20 mEq, oral, 2 times daily    psyllium (Metamucil) 3.4 gram packet 3 capsules, oral, Daily RT    rosuvastatin (CRESTOR) 40 mg, oral, Daily    vitamin D3-vitamin K2 1,250-200 mcg capsule oral        Objective   Visit Vitals  Pulse 60      Physical Exam  Constitutional:       Appearance: Normal appearance.   HENT:      Mouth/Throat:      Mouth: Mucous membranes are moist.      Pharynx: Oropharynx is clear.   Eyes:      Extraocular Movements: Extraocular movements intact.      Pupils: Pupils are equal, round, and reactive to light.   Cardiovascular:      Rate and Rhythm: Normal rate and regular rhythm.      Heart sounds: No murmur heard.  Pulmonary:      Effort: Pulmonary effort is normal.      Breath sounds: Normal breath sounds.   Abdominal:      General: Abdomen is flat. Bowel sounds are normal.      Palpations: Abdomen is soft. There is no mass.   Skin:     General: Skin is warm and dry.   Neurological:      Mental Status: He is alert.   Psychiatric:         Mood and Affect: Mood normal.         Behavior: Behavior normal.         Thought Content: Thought content normal.         Judgment: Judgment normal.               Results from last 7 days   Lab Units 06/27/24  1005   SODIUM mmol/L 142   POTASSIUM mmol/L 3.5   CHLORIDE mmol/L 101   CO2 mmol/L 30   BUN mg/dL 17   CREATININE mg/dL 1.05   CALCIUM mg/dL 9.4   PROTEIN TOTAL g/dL 7.3   BILIRUBIN TOTAL mg/dL 0.6   ALK PHOS U/L 52   ALT U/L 26   AST U/L 34   GLUCOSE mg/dL 93           Assessment/Plan   Colin Boswell is a 83 y.o. male who presents to GI clinic for   Now resolved abnormal LFT - likely related to a viral cause.  GI symptoms steadily improving/less frequent.    Does have history of peptic ulcer (from then treated H. Pylori) and is on daily aspirin 81 mg. Could have  recurrence. Less likely but remote neoplasm.    If symptoms fail to fully resolve over next month or if they worsen at any point will do EGD. (CT was normal for pancreas at [facility])    Plan:  Continue famotidine up to tid prn    If any residual sx in 2-3 weeks or after will do EGD and also consider long-term PPI prophylaxis.    Let me know if any problems.      Shaq Ontiveros MD

## 2024-07-31 ENCOUNTER — OFFICE VISIT (OUTPATIENT)
Dept: GASTROENTEROLOGY | Facility: CLINIC | Age: 84
End: 2024-07-31
Payer: MEDICARE

## 2024-07-31 ENCOUNTER — LAB (OUTPATIENT)
Dept: LAB | Facility: LAB | Age: 84
End: 2024-07-31
Payer: MEDICARE

## 2024-07-31 VITALS — HEIGHT: 67 IN | OXYGEN SATURATION: 96 % | BODY MASS INDEX: 28.35 KG/M2 | HEART RATE: 59 BPM | WEIGHT: 180.6 LBS

## 2024-07-31 DIAGNOSIS — K62.5 RECTAL BLEEDING: ICD-10-CM

## 2024-07-31 DIAGNOSIS — R19.5 CHANGE IN STOOL: Primary | ICD-10-CM

## 2024-07-31 LAB
ERYTHROCYTE [DISTWIDTH] IN BLOOD BY AUTOMATED COUNT: 13.8 % (ref 11.5–14.5)
HCT VFR BLD AUTO: 43.4 % (ref 41–52)
HGB BLD-MCNC: 13.6 G/DL (ref 13.5–17.5)
MCH RBC QN AUTO: 29.2 PG (ref 26–34)
MCHC RBC AUTO-ENTMCNC: 31.3 G/DL (ref 32–36)
MCV RBC AUTO: 93 FL (ref 80–100)
NRBC BLD-RTO: 0 /100 WBCS (ref 0–0)
PLATELET # BLD AUTO: 182 X10*3/UL (ref 150–450)
RBC # BLD AUTO: 4.65 X10*6/UL (ref 4.5–5.9)
WBC # BLD AUTO: 6.4 X10*3/UL (ref 4.4–11.3)

## 2024-07-31 PROCEDURE — 36415 COLL VENOUS BLD VENIPUNCTURE: CPT

## 2024-07-31 PROCEDURE — 1036F TOBACCO NON-USER: CPT | Performed by: INTERNAL MEDICINE

## 2024-07-31 PROCEDURE — 85027 COMPLETE CBC AUTOMATED: CPT

## 2024-07-31 PROCEDURE — 99214 OFFICE O/P EST MOD 30 MIN: CPT | Performed by: INTERNAL MEDICINE

## 2024-07-31 PROCEDURE — 1159F MED LIST DOCD IN RCRD: CPT | Performed by: INTERNAL MEDICINE

## 2024-07-31 PROCEDURE — 1160F RVW MEDS BY RX/DR IN RCRD: CPT | Performed by: INTERNAL MEDICINE

## 2024-07-31 NOTE — PROGRESS NOTES
Subjective     History of Present Illness:   Colin Boswell is a 84 y.o. male who presents to GI clinic for rectal bleeding..  He had a harder bowel movement and needed to strain.  Afterwards a couple spots of blood in the toilet and then had a lot of blood on the toilet paper.  Has had 2 subsequent bowel movements with no blood.  The dyspepsia/upper abdominal discomfort he was experiencing a few weeks ago has resolved.    He takes MiraLAX on a daily basis and often a stool softener.  Has a longstanding history of intermittent constipation and a remote history of hemorrhoidectomy.    No other changes in bowel movements.    Review of Systems  Review of Systems    Social History   reports that he quit smoking about 37 years ago. His smoking use included cigarettes. He has quit using smokeless tobacco. He reports current alcohol use of about 1.0 standard drink of alcohol per week.     Allergies  Allergies   Allergen Reactions    Tree Nut Anaphylaxis    Tree Nuts Anaphylaxis and Shortness of breath     Since a kid    Sulfamethoxazole-Trimethoprim GI Upset    Acetaminophen Unknown    Amlodipine Swelling     Leg edema    Ciprofloxacin GI Upset    Codeine Hallucinations and Unknown    Morphine Unknown    Oxycodone-Acetaminophen Hallucinations    Propoxyphene Unknown    Propoxyphene N-Acetaminophen Unknown    Banana GI Upset    Lactose Unknown    Levofloxacin GI Upset    Red Wine Extract GI Upset    Squash GI Upset       Medications  Current Outpatient Medications   Medication Instructions    ascorbic acid (Vitamin C) 500 mg tablet oral    aspirin 81 mg EC tablet 1 tablet, oral, Every other day    chlorthalidone (HYGROTON) 12.5 mg, oral, Daily RT    cholecalciferol (VITAMIN D-3) 4,000 Units, oral, Daily RT    clindamycin (Cleocin T) 1 % lotion Topical, Daily, 60g    famotidine (Pepcid) 10 mg tablet oral    fluticasone (Flonase) 50 mcg/actuation nasal spray SHAKE LIQUID AND USE 2 SPRAYS IN EACH NOSTRIL DAILY AT BEDTIME     folic acid (Folvite) 1 mg tablet TAKE 1 TABLET BY MOUTH ONCE DAILY. NEEDS NEW PCP FOR FURTHER REFILLS    LACTOBACILLUS RHAMNOSUS GG ORAL oral    loratadine (CLARITIN) 10 mg, oral, 2 times daily    metoprolol tartrate (Lopressor) 50 mg tablet     ondansetron (ZOFRAN) 4 mg, oral, Every 8 hours PRN    polyethylene glycol (Glycolax, Miralax) 17 gram/dose powder oral    potassium chloride CR 20 mEq ER tablet 20 mEq, oral, 2 times daily    psyllium (Metamucil) 3.4 gram packet 3 capsules, oral, Daily RT    rosuvastatin (CRESTOR) 40 mg, oral, Daily    vitamin D3-vitamin K2 1,250-200 mcg capsule oral        Objective   Visit Vitals  Pulse 59      Physical Exam  Constitutional:       Appearance: Normal appearance. He is normal weight.   HENT:      Head: Normocephalic and atraumatic.      Mouth/Throat:      Mouth: Mucous membranes are dry.   Eyes:      Extraocular Movements: Extraocular movements intact.      Pupils: Pupils are equal, round, and reactive to light.   Genitourinary:     Comments: External normal  Digital rectal examination normal except for possible small palpable internal hemorrhoid at the left lateral position.  No masses palpable.  Neurological:      Mental Status: He is alert.   Psychiatric:         Mood and Affect: Mood normal.         Thought Content: Thought content normal.         Judgment: Judgment normal.                       Assessment/Plan   Colin Boswell is a 84 y.o. male who presents to GI clinic for rectal bleeding present rectal bleeding as described above -- primarily blood on the toilet paper after straining.  No pain, discomfort.  Likely hemorrhoidal less likely fissure and even less likely neoplasm, proctitis, AVM.    Plan    Add second dose of MiraLAX every other day for 2 weeks  CBC  Let me know if any problems.      Shaq Ontiveros MD

## 2024-11-21 ASSESSMENT — DERMATOLOGY QUALITY OF LIFE (QOL) ASSESSMENT
RATE HOW BOTHERED YOU ARE BY SYMPTOMS OF YOUR SKIN PROBLEM (EG, ITCHING, STINGING BURNING, HURTING OR SKIN IRRITATION): 0 - NEVER BOTHERED
RATE HOW BOTHERED YOU ARE BY SYMPTOMS OF YOUR SKIN PROBLEM (EG, ITCHING, STINGING BURNING, HURTING OR SKIN IRRITATION): 0 - NEVER BOTHERED
RATE HOW EMOTIONALLY BOTHERED YOU ARE BY YOUR SKIN PROBLEM (FOR EXAMPLE, WORRY, EMBARRASSMENT, FRUSTRATION): 0 - NEVER BOTHERED
WHAT SINGLE SKIN CONDITION LISTED BELOW IS THE PATIENT ANSWERING THE QUALITY-OF-LIFE ASSESSMENT QUESTIONS ABOUT: NONE OF THE ABOVE
RATE HOW BOTHERED YOU ARE BY EFFECTS OF YOUR SKIN PROBLEMS ON YOUR ACTIVITIES (EG, GOING OUT, ACCOMPLISHING WHAT YOU WANT, WORK ACTIVITIES OR YOUR RELATIONSHIPS WITH OTHERS): 0 - NEVER BOTHERED
WHAT SINGLE SKIN CONDITION LISTED BELOW IS THE PATIENT ANSWERING THE QUALITY-OF-LIFE ASSESSMENT QUESTIONS ABOUT: NONE OF THE ABOVE

## 2024-11-25 ENCOUNTER — APPOINTMENT (OUTPATIENT)
Dept: DERMATOLOGY | Facility: CLINIC | Age: 84
End: 2024-11-25
Payer: MEDICARE

## 2024-11-25 DIAGNOSIS — L82.1 SEBORRHEIC KERATOSIS: ICD-10-CM

## 2024-11-25 DIAGNOSIS — L57.8 DIFFUSE PHOTODAMAGE OF SKIN: ICD-10-CM

## 2024-11-25 DIAGNOSIS — D48.5 NEOPLASM OF UNCERTAIN BEHAVIOR OF SKIN: Primary | ICD-10-CM

## 2024-11-25 DIAGNOSIS — L82.0 INFLAMED SEBORRHEIC KERATOSIS: ICD-10-CM

## 2024-11-25 DIAGNOSIS — L73.9 FOLLICULITIS: ICD-10-CM

## 2024-11-25 DIAGNOSIS — D18.01 HEMANGIOMA OF SKIN: ICD-10-CM

## 2024-11-25 DIAGNOSIS — L57.0 ACTINIC KERATOSIS: ICD-10-CM

## 2024-11-25 DIAGNOSIS — D22.5 MELANOCYTIC NEVUS OF TRUNK: ICD-10-CM

## 2024-11-25 DIAGNOSIS — Z85.828 HISTORY OF NONMELANOMA SKIN CANCER: ICD-10-CM

## 2024-11-25 PROCEDURE — 17110 DESTRUCTION B9 LES UP TO 14: CPT | Performed by: DERMATOLOGY

## 2024-11-25 PROCEDURE — 17004 DESTROY PREMAL LESIONS 15/>: CPT | Performed by: DERMATOLOGY

## 2024-11-25 PROCEDURE — 99214 OFFICE O/P EST MOD 30 MIN: CPT | Performed by: DERMATOLOGY

## 2024-11-25 PROCEDURE — 11306 SHAVE SKIN LESION 0.6-1.0 CM: CPT | Performed by: DERMATOLOGY

## 2024-11-26 NOTE — PROGRESS NOTES
"Subjective     Colin Boswell is a 84 y.o. male who presents for the following: Skin Exam.  He notes a new scaly bump on the left side of his neck, which has been present for a few months, has increased in size, and sometimes hurts.  He also notes a few raised, brown, rough bumps on the left side of his neck and his right side, which have been present for several months and have been itching recently.  They have not changed in any other way, including in size, shape, or color, and they do not hurt or bleed.  He also notes intermittent pimple breakouts on his abdomen.  He denies any other new, changing, or concerning skin lesions since his last visit; no bleeding, itching, or burning lesions.      Review of Systems:  No other skin or systemic complaints other than what is documented elsewhere in the note.    The following portions of the chart were reviewed this encounter and updated as appropriate:       Skin Cancer History  No skin cancer on file.    Specialty Problems    None      Past Dermatologic / Past Relevant Medical History:    - history of BCC on \"inf cutaneous lip lt\" diagnosed by Dr. Darrell Lancaster on 6/18/21 s/p Mohs surgery by Dr. Zuniga on 8/17/21   - reported history of BCC on left lower leg diagnosed by Dr. Lancaster on 8/17/22 s/p radiation therapy by Dr. Lancaster in September and October 2022, according to the patient  - AKs   - no h/o melanoma    Family History:    No family history of melanoma or skin cancer    Social History:    The patient states he is retired from working as an  for [employer] and lived in [place], [place]; he states he will be having his first great-grandchild this March 2025    Allergies:  Tree nuts, Sulfamethoxazole-trimethoprim, Acetaminophen, Amlodipine, Ciprofloxacin, Codeine, Morphine, Oxycodone-acetaminophen, Propoxyphene, Propoxyphene n-acetaminophen, Banana, Lactose, Levofloxacin, Red wine extract, and Squash    Current Medications / CAM's:    Current " Outpatient Medications:     ascorbic acid (Vitamin C) 500 mg tablet, Take by mouth., Disp: , Rfl:     aspirin 81 mg EC tablet, Take 1 tablet (81 mg) by mouth every other day., Disp: , Rfl:     chlorthalidone (Hygroton) 25 mg tablet, Take 0.5 tablets (12.5 mg) by mouth once daily., Disp: , Rfl:     cholecalciferol (Vitamin D-3) 50 mcg (2,000 unit) capsule, Take 2 capsules (100 mcg) by mouth once daily., Disp: , Rfl:     clindamycin (Cleocin T) 1 % lotion, Apply topically once daily. 60g, Disp: 60 mL, Rfl: 11    famotidine (Pepcid) 10 mg tablet, Take by mouth., Disp: , Rfl:     fluticasone (Flonase) 50 mcg/actuation nasal spray, SHAKE LIQUID AND USE 2 SPRAYS IN EACH NOSTRIL DAILY AT BEDTIME, Disp: , Rfl:     folic acid (Folvite) 1 mg tablet, TAKE 1 TABLET BY MOUTH ONCE DAILY. NEEDS NEW PCP FOR FURTHER REFILLS, Disp: , Rfl:     LACTOBACILLUS RHAMNOSUS GG ORAL, Take by mouth., Disp: , Rfl:     loratadine (Claritin) 10 mg tablet, Take 1 tablet (10 mg) by mouth 2 times a day., Disp: , Rfl:     metoprolol tartrate (Lopressor) 50 mg tablet, , Disp: , Rfl:     ondansetron (Zofran) 4 mg tablet, Take 1 tablet (4 mg) by mouth every 8 hours if needed., Disp: , Rfl:     polyethylene glycol (Glycolax, Miralax) 17 gram/dose powder, Take by mouth., Disp: , Rfl:     potassium chloride CR 20 mEq ER tablet, Take 1 tablet (20 mEq) by mouth twice a day., Disp: , Rfl:     psyllium (Metamucil) 3.4 gram packet, Take 3 capsules by mouth once daily., Disp: , Rfl:     rosuvastatin (Crestor) 40 mg tablet, Take 1 tablet (40 mg) by mouth once daily., Disp: , Rfl:     vitamin D3-vitamin K2 1,250-200 mcg capsule, Take by mouth., Disp: , Rfl:      Objective   Well appearing patient in no apparent distress; mood and affect are within normal limits.    A full examination was performed including scalp, face, eyes, ears, nose, lips, neck, chest, axillae, abdomen, back, bilateral upper extremities, and bilateral lower extremities. All findings within  normal limits unless otherwise noted below.    Assessment/Plan   1. Neoplasm of uncertain behavior of skin  Left Lateral Inferior Posterior Neck  6 mm erythematous, scaly papule           Shave removal    Lesion length (cm):  0.6  Margin per side (cm):  0  Lesion diameter (cm):  0.6  Informed consent: discussed and consent obtained    Timeout: patient name, date of birth, surgical site, and procedure verified    Procedure prep:  Patient was prepped and draped  Anesthesia: the lesion was anesthetized in a standard fashion    Anesthetic:  1% lidocaine w/ epinephrine 1-100,000 local infiltration  Instrument used: flexible razor blade    Hemostasis achieved with: aluminum chloride    Outcome: patient tolerated procedure well    Post-procedure details: sterile dressing applied and wound care instructions given    Dressing type: bandage and petrolatum      Staff Communication: Dermatology Local Anesthesia: 1 % Lidocaine / Epinephrine - Amount:0.5ml    Specimen 1 - Dermatopathology- DERM LAB  Differential Diagnosis: ISK v SCCIS  Check Margins Yes/No?:    Comments:    Dermpath Lab: Routine Histopathology (formalin-fixed tissue)    2. Inflamed seborrheic keratosis (3)  Right Flank (3)  On the patient's left lateral inferior neck and scattered on his right flank, there are 3 similar-appearing 1 cm erythematous and light brown-colored, hyperkeratotic, stuck-on appearing papules each with a surrounding rim of erythema    Inflamed Seborrheic Keratoses -left lateral inferior neck and scattered on right flank.  The benign nature of these lesions was discussed with the patient today and reassurance provided.  Given the history the patient provides of frequent irritation and associated symptoms as well as their inflamed appearance on exam today, I offered to treat these 3 lesions with liquid nitrogen cryotherapy.  The patient expressed understanding, is in agreement with this plan, and wishes to proceed with cryotherapy  today.    Destr of lesion - Right Flank (3)  Complexity: simple    Destruction method: cryotherapy    Informed consent: discussed and consent obtained    Lesion destroyed using liquid nitrogen: Yes    Cryotherapy cycles:  2  Outcome: patient tolerated procedure well with no complications    Post-procedure details: wound care instructions given      3. Actinic keratosis (16)  Head - Anterior (Face) (16)  Scattered on the patient's face, there are multiple erythematous, gritty, scaly macules     Actinic Keratoses - scattered on face.  The pre-cancerous nature of these lesions and treatment options were discussed with the patient today.  At this time, I recommend treatment with liquid nitrogen cryotherapy.  The patient expressed understanding, is in agreement with this plan, and wishes to proceed with cryotherapy today.    Destr of lesion - Head - Anterior (Face) (16)  Complexity: simple    Destruction method: cryotherapy    Informed consent: discussed and consent obtained    Lesion destroyed using liquid nitrogen: Yes    Cryotherapy cycles:  1  Outcome: patient tolerated procedure well with no complications    Post-procedure details: wound care instructions given      4. Melanocytic nevus of trunk  Scattered on the patient's face, neck, trunk, and extremities, there are several small, round- to oval-shaped, brown-pigmented and pink-colored, symmetric, uniform-appearing macules and dome-shaped papules    Clinically benign- to slightly atypical-appearing nevi - the clinically benign- to slightly atypical-appearing nature of the patient's nevi was discussed with the patient today.  None of the patient's nevi meet threshold for biopsy today.  I emphasized the importance of performing monthly self-skin exams using the ABCDs of monitoring moles, which were reviewed with the patient today and an informational hand-out provided.  I also emphasized the importance of sun avoidance and sun protection with daily sunscreen use.   The patient expressed understanding and is in agreement with this plan.    5. Seborrheic keratosis  Scattered on the patient's face, neck, trunk, and extremities, there are multiple tan- to light brown-colored, hyperkeratotic, stuck-on appearing papules of varying size and shape    Seborrheic Keratoses - the benign nature of these lesions was discussed with the patient today and reassurance provided.  No treatment is medically indicated for the noninflamed SKs at this time.    6. Hemangioma of skin  Scattered on the patient's face, neck, trunk, and extremities, there are multiple small, round, cherry red- to purplish-colored, symmetric, uniform, vascular-appearing macules and papules    Cherry Angiomas - the benign nature of these vascular lesions was discussed with the patient today and reassurance provided.  No treatment is medically indicated for these lesions at this time.    7. Folliculitis  Left Abdomen (side) - Upper  Scattered on the patient's abdomen and upper back, there are several follicular-based erythematous, inflammatory papules and pustules    Folliculitis -flare on abdomen and upper back.  The bacterial nature of this condition and treatment options were discussed with the patient today.  At this time, I recommend topical antibiotic therapy with Clindamycin 1% lotion, which the patient was instructed to apply twice daily to the affected areas or up to 3-4 times per day as needed for active lesions.  The risks, benefits, and side effects of this medication were discussed.  The patient expressed understanding and is in agreement with this plan.    Related Medications  clindamycin (Cleocin T) 1 % lotion  Apply topically once daily. 60g    8. History of nonmelanoma skin cancer  On the patient's inferior cutaneous lip lt and left lower leg, there are well-healed scars with no evidence of recurrent growth on exam today.    History of basal cell carcinomas and actinic keratoses and photodamage.  There is  no evidence of recurrence on exam today.  The signs and symptoms of skin cancer were reviewed and the patient was advised to practice sun protection and sun avoidance, use daily sunscreen, and perform regular self skin exams.  I will have the patient return to our office in 4-6 months, pending the above biopsy result, for routine follow-up and skin exam, and the patient was instructed to call our office should the patient notice any new, changing, symptomatic, or otherwise concerning skin lesions before then.  The patient expressed understanding and is in agreement with this plan.    Related Procedures  Follow Up In Dermatology - Established Patient    9. Diffuse photodamage of skin  Diffuse photodamage with actinic changes with telangiectasia and mottled pigmentation in sun-exposed areas.    Photodamage.  The signs and symptoms of skin cancer were reviewed and the patient was advised to practice sun protection and sun avoidance, use daily sunscreen, and perform regular self skin exams.  Sun protection was discussed, including avoiding the mid-day sun, wearing a sunscreen with SPF at least 50, and stressing the need for reapplication of sunscreen and applying more than they think they need.

## 2025-04-10 ENCOUNTER — OFFICE VISIT (OUTPATIENT)
Dept: GASTROENTEROLOGY | Facility: CLINIC | Age: 85
End: 2025-04-10
Payer: MEDICARE

## 2025-04-10 VITALS — OXYGEN SATURATION: 96 % | BODY MASS INDEX: 29.13 KG/M2 | WEIGHT: 186 LBS | HEART RATE: 73 BPM

## 2025-04-10 DIAGNOSIS — K58.1 IRRITABLE BOWEL SYNDROME WITH CONSTIPATION: Primary | ICD-10-CM

## 2025-04-10 PROCEDURE — 1160F RVW MEDS BY RX/DR IN RCRD: CPT | Performed by: INTERNAL MEDICINE

## 2025-04-10 PROCEDURE — 99213 OFFICE O/P EST LOW 20 MIN: CPT | Performed by: INTERNAL MEDICINE

## 2025-04-10 PROCEDURE — 1159F MED LIST DOCD IN RCRD: CPT | Performed by: INTERNAL MEDICINE

## 2025-04-10 RX ORDER — PSYLLIUM HUSK/CALCIUM CARB 1 G-60 MG
CAPSULE ORAL
COMMUNITY

## 2025-04-10 RX ORDER — PREDNISOLONE ACETATE 10 MG/ML
1 SUSPENSION/ DROPS OPHTHALMIC 2 TIMES DAILY
COMMUNITY
Start: 2024-07-08

## 2025-04-10 RX ORDER — RAMIPRIL 10 MG/1
CAPSULE ORAL
COMMUNITY

## 2025-04-10 RX ORDER — TRIAMCINOLONE ACETONIDE 1 MG/G
CREAM TOPICAL
COMMUNITY
Start: 2023-02-24

## 2025-04-10 RX ORDER — OMEPRAZOLE 20 MG/1
1 CAPSULE, DELAYED RELEASE ORAL 2 TIMES DAILY
COMMUNITY
Start: 2012-10-12

## 2025-04-10 RX ORDER — AMLODIPINE BESYLATE 5 MG/1
TABLET ORAL
COMMUNITY
Start: 2016-03-02

## 2025-04-10 RX ORDER — GENTAMICIN SULFATE 1 MG/G
CREAM TOPICAL
COMMUNITY
Start: 2023-03-10

## 2025-04-10 NOTE — PROGRESS NOTES
Subjective     History of Present Illness:   Colin Boswell is a 84 y.o. male who presents to GI clinic for variable bowel movements and intermittent abdominal pain.  Gets left lower quadrant pressure periodically.  Bowel movements vary.  He keeps trying to not use the MiraLAX and then will several days go by without bowel movement.  Stools hard.  Does not get the urge to defecate.  Generally pressures have bowel movement when he urinates in the morning which he does sitting down because of a history of vasovagal syndrome.  Often that is helpful but not always.  No change in diet.  Otherwise feeling well.  Please.    Review of Systems  Review of Systems    Social History   reports that he quit smoking about 38 years ago. His smoking use included cigarettes. He has quit using smokeless tobacco. He reports current alcohol use of about 1.0 standard drink of alcohol per week.     Allergies  Allergies   Allergen Reactions    Tree Nuts Anaphylaxis and Shortness of breath     Since a kid    Sulfamethoxazole-Trimethoprim GI Upset    Acetaminophen Unknown    Amlodipine Swelling     Leg edema    Ciprofloxacin GI Upset    Codeine Hallucinations and Unknown    Morphine Unknown    Oxycodone-Acetaminophen Hallucinations    Propoxyphene Unknown    Propoxyphene N-Acetaminophen Unknown    Banana GI Upset    Lactose Unknown    Levofloxacin GI Upset    Red Wine Extract GI Upset    Squash GI Upset       Medications  Current Outpatient Medications   Medication Instructions    amLODIPine (Norvasc) 5 mg tablet     ascorbic acid (Vitamin C) 500 mg tablet oral    aspirin 81 mg EC tablet 1 tablet, oral, Every other day    chlorthalidone (HYGROTON) 12.5 mg, oral, Daily RT    cholecalciferol (VITAMIN D-3) 4,000 Units, oral, Daily RT    clindamycin (Cleocin T) 1 % lotion Topical, Daily, 60g    famotidine (Pepcid) 10 mg tablet oral    fluticasone (Flonase) 50 mcg/actuation nasal spray SHAKE LIQUID AND USE 2 SPRAYS IN EACH NOSTRIL DAILY AT  BEDTIME    folic acid (Folvite) 1 mg tablet TAKE 1 TABLET BY MOUTH ONCE DAILY. NEEDS NEW PCP FOR FURTHER REFILLS    gentamicin (Garamycin) 0.1 % cream Apply topically.    LACTOBACILLUS RHAMNOSUS GG ORAL oral    loratadine (CLARITIN) 10 mg, oral, 2 times daily    metoprolol tartrate (Lopressor) 50 mg tablet     omeprazole (PriLOSEC) 20 mg DR capsule 1 capsule, 2 times daily    ondansetron (ZOFRAN) 4 mg, oral, Every 8 hours PRN    polyethylene glycol (Glycolax, Miralax) 17 gram/dose powder oral    potassium chloride CR 20 mEq ER tablet 20 mEq, oral, 2 times daily    prednisoLONE acetate (Pred-Forte) 1 % ophthalmic suspension 1 drop, 2 times daily    psyllium (Metamucil) 3.4 gram packet 3 capsules, oral, Daily RT    psyllium husk-calcium (Metamucil Plus Calcium) 1-60 gram-mg capsule oral    ramipril (Altace) 10 mg capsule     rosuvastatin (CRESTOR) 40 mg, oral, Daily    triamcinolone (Kenalog) 0.1 % cream Apply topically.    vitamin D3-vitamin K2 1,250-200 mcg capsule oral        Objective   Visit Vitals  Pulse 73      Physical Exam  Constitutional:       Appearance: Normal appearance.   HENT:      Mouth/Throat:      Mouth: Mucous membranes are moist.      Pharynx: Oropharynx is clear.   Eyes:      Extraocular Movements: Extraocular movements intact.      Pupils: Pupils are equal, round, and reactive to light.   Cardiovascular:      Rate and Rhythm: Normal rate and regular rhythm.      Heart sounds: No murmur heard.  Pulmonary:      Effort: Pulmonary effort is normal.      Breath sounds: Normal breath sounds.   Abdominal:      General: Abdomen is flat. Bowel sounds are normal.      Palpations: Abdomen is soft. There is no mass.   Skin:     General: Skin is warm and dry.   Neurological:      Mental Status: He is alert.   Psychiatric:         Mood and Affect: Mood normal.         Behavior: Behavior normal.         Thought Content: Thought content normal.         Judgment: Judgment normal.                        Assessment/Plan   Colin Boswell is a 84 y.o. male who presents to GI clinic for variable bowel movements, intermittent abdominal bloating and sometimes left lower quadrant pain.  Currently asymptomatic.  Most likely recurrent ongoing symptoms with his background of IBS.  Less likely idiopathic IBD, ischemia (no postprandial pattern or weight loss) and even less likely neoplasia.    Plan    Discussed titration of MiraLAX dose  Recommended daily MiraLAX with adjustment of quantity based on bowel movement response from half to 1 full scoop  Let me know if any further problems.      Shaq Ontiveros MD

## 2025-05-29 ENCOUNTER — APPOINTMENT (OUTPATIENT)
Dept: DERMATOLOGY | Facility: CLINIC | Age: 85
End: 2025-05-29
Payer: MEDICARE

## 2025-05-29 DIAGNOSIS — L57.0 ACTINIC KERATOSIS: ICD-10-CM

## 2025-05-29 DIAGNOSIS — D48.5 NEOPLASM OF UNCERTAIN BEHAVIOR OF SKIN: Primary | ICD-10-CM

## 2025-05-29 DIAGNOSIS — L82.1 SEBORRHEIC KERATOSIS: ICD-10-CM

## 2025-05-29 DIAGNOSIS — L57.8 DIFFUSE PHOTODAMAGE OF SKIN: ICD-10-CM

## 2025-05-29 DIAGNOSIS — L82.0 INFLAMED SEBORRHEIC KERATOSIS: ICD-10-CM

## 2025-05-29 DIAGNOSIS — D18.01 HEMANGIOMA OF SKIN: ICD-10-CM

## 2025-05-29 DIAGNOSIS — L91.8 SKIN TAG: ICD-10-CM

## 2025-05-29 DIAGNOSIS — Z85.828 HISTORY OF NONMELANOMA SKIN CANCER: ICD-10-CM

## 2025-05-29 DIAGNOSIS — L73.9 FOLLICULITIS: ICD-10-CM

## 2025-05-29 DIAGNOSIS — D22.5 MELANOCYTIC NEVUS OF TRUNK: ICD-10-CM

## 2025-05-29 PROCEDURE — 17004 DESTROY PREMAL LESIONS 15/>: CPT | Performed by: DERMATOLOGY

## 2025-05-29 PROCEDURE — 99214 OFFICE O/P EST MOD 30 MIN: CPT | Performed by: DERMATOLOGY

## 2025-05-29 PROCEDURE — 1159F MED LIST DOCD IN RCRD: CPT | Performed by: DERMATOLOGY

## 2025-05-29 PROCEDURE — 17110 DESTRUCTION B9 LES UP TO 14: CPT | Performed by: DERMATOLOGY

## 2025-05-29 PROCEDURE — 11200 RMVL SKIN TAGS UP TO&INC 15: CPT | Performed by: DERMATOLOGY

## 2025-05-29 PROCEDURE — 11301 SHAVE SKIN LESION 0.6-1.0 CM: CPT | Performed by: DERMATOLOGY

## 2025-05-29 RX ORDER — CLINDAMYCIN PHOSPHATE 10 UG/ML
LOTION TOPICAL 2 TIMES DAILY
Qty: 60 ML | Refills: 11 | Status: SHIPPED | OUTPATIENT
Start: 2025-05-29 | End: 2026-05-29

## 2025-05-29 ASSESSMENT — DERMATOLOGY QUALITY OF LIFE (QOL) ASSESSMENT
WHAT SINGLE SKIN CONDITION LISTED BELOW IS THE PATIENT ANSWERING THE QUALITY-OF-LIFE ASSESSMENT QUESTIONS ABOUT: NONE OF THE ABOVE
RATE HOW EMOTIONALLY BOTHERED YOU ARE BY YOUR SKIN PROBLEM (FOR EXAMPLE, WORRY, EMBARRASSMENT, FRUSTRATION): 0 - NEVER BOTHERED
RATE HOW EMOTIONALLY BOTHERED YOU ARE BY YOUR SKIN PROBLEM (FOR EXAMPLE, WORRY, EMBARRASSMENT, FRUSTRATION): 0 - NEVER BOTHERED
ARE THERE EXCLUSIONS OR EXCEPTIONS FOR THE QUALITY OF LIFE ASSESSMENT: NO
RATE HOW BOTHERED YOU ARE BY EFFECTS OF YOUR SKIN PROBLEMS ON YOUR ACTIVITIES (EG, GOING OUT, ACCOMPLISHING WHAT YOU WANT, WORK ACTIVITIES OR YOUR RELATIONSHIPS WITH OTHERS): 0 - NEVER BOTHERED
DATE THE QUALITY-OF-LIFE ASSESSMENT WAS COMPLETED: 67354
RATE HOW BOTHERED YOU ARE BY SYMPTOMS OF YOUR SKIN PROBLEM (EG, ITCHING, STINGING BURNING, HURTING OR SKIN IRRITATION): 0 - NEVER BOTHERED
RATE HOW BOTHERED YOU ARE BY SYMPTOMS OF YOUR SKIN PROBLEM (EG, ITCHING, STINGING BURNING, HURTING OR SKIN IRRITATION): 0 - NEVER BOTHERED
WHAT SINGLE SKIN CONDITION LISTED BELOW IS THE PATIENT ANSWERING THE QUALITY-OF-LIFE ASSESSMENT QUESTIONS ABOUT: NONE OF THE ABOVE
RATE HOW BOTHERED YOU ARE BY EFFECTS OF YOUR SKIN PROBLEMS ON YOUR ACTIVITIES (EG, GOING OUT, ACCOMPLISHING WHAT YOU WANT, WORK ACTIVITIES OR YOUR RELATIONSHIPS WITH OTHERS): 0 - NEVER BOTHERED
RATE HOW BOTHERED YOU ARE BY EFFECTS OF YOUR SKIN PROBLEMS ON YOUR ACTIVITIES (EG, GOING OUT, ACCOMPLISHING WHAT YOU WANT, WORK ACTIVITIES OR YOUR RELATIONSHIPS WITH OTHERS): 0 - NEVER BOTHERED
RATE HOW BOTHERED YOU ARE BY SYMPTOMS OF YOUR SKIN PROBLEM (EG, ITCHING, STINGING BURNING, HURTING OR SKIN IRRITATION): 0 - NEVER BOTHERED
RATE HOW EMOTIONALLY BOTHERED YOU ARE BY YOUR SKIN PROBLEM (FOR EXAMPLE, WORRY, EMBARRASSMENT, FRUSTRATION): 0 - NEVER BOTHERED
WHAT SINGLE SKIN CONDITION LISTED BELOW IS THE PATIENT ANSWERING THE QUALITY-OF-LIFE ASSESSMENT QUESTIONS ABOUT: NONE OF THE ABOVE

## 2025-05-29 ASSESSMENT — ITCH NUMERIC RATING SCALE: HOW SEVERE IS YOUR ITCHING?: 0

## 2025-05-29 ASSESSMENT — DERMATOLOGY PATIENT ASSESSMENT
HAVE YOU HAD OR DO YOU HAVE A STAPH INFECTION: NO
ARE YOU AN ORGAN TRANSPLANT RECIPIENT: NO
DO YOU HAVE ANY NEW OR CHANGING LESIONS: NO
DO YOU USE SUNSCREEN: OCCASIONALLY
HAVE YOU HAD OR DO YOU HAVE VASCULAR DISEASE: NO
DO YOU USE A TANNING BED: NO

## 2025-05-29 ASSESSMENT — PATIENT GLOBAL ASSESSMENT (PGA)
WHAT IS THE PGA: PATIENT GLOBAL ASSESSMENT:  1 - CLEAR
PATIENT GLOBAL ASSESSMENT: PATIENT GLOBAL ASSESSMENT:  1 - CLEAR

## 2025-05-29 NOTE — Clinical Note
Chart reviewed by Heart Failure Nurse Navigator. Heart Failure database completed. Patient with recent watchman device 10/13 followed by rectal bleed requiring rectal clip on 11/23. Patient admitted with increasing SOB and LLE, responding to IV diuretics. EF:  Most recent Echo with EF 55 to 60%. Limited Echo is currently pending. ACEi/ARB/ARNi: Lisinopril 20 mg daily. BB: Coreg 3.125 mg BID. Aldosterone Antagonist: Not indicated. Obstructive Sleep Apnea Screening:   STOP-BANG score:   Referred to Sleep Medicine:     CRT Not indicated. NYHA Functional Class **. Heart Failure Teach Back in Patient Education. Heart Failure Avoiding Triggers on Discharge Instructions. Cardiologist: Dr. Luis Mccauley    Electrophysiologist: Dr. Skip Ruffin discharge follow up phone call to be made within 48-72 hours of discharge. History of basal cell carcinomas and actinic keratoses and photodamage.  There is no evidence of recurrence on exam today.  The signs and symptoms of skin cancer were reviewed and the patient was advised to practice sun protection and sun avoidance, use daily sunscreen, and perform regular self skin exams.  I will have the patient return to our office in 4-6 months, pending the above biopsy result, for routine follow-up and skin exam, and the patient was instructed to call our office should the patient notice any new, changing, symptomatic, or otherwise concerning skin lesions before then.  The patient expressed understanding and is in agreement with this plan.

## 2025-05-29 NOTE — Clinical Note
Du Angiomas - the benign nature of these vascular lesions was discussed with the patient today and reassurance provided.  No treatment is medically indicated for these lesions at this time.

## 2025-05-29 NOTE — Clinical Note
Actinic Keratoses - scattered on face.  The pre-cancerous nature of these lesions and treatment options were discussed with the patient today.  At this time, I recommend treatment with liquid nitrogen cryotherapy.  The patient expressed understanding, is in agreement with this plan, and wishes to proceed with cryotherapy today.

## 2025-05-29 NOTE — PROGRESS NOTES
"Subjective     Colin Boswell is a 84 y.o. male who presents for the following: Skin Check.  He notes a brown, raised, rough bump in his left hairline, which has been present for several months and has been itching recently.  It has not changed in any other way, including in size, shape, or color, and it does not hurt or bleed.  He also notes a raised bump on the left side of his neck, which has been present for several months and frequently itches, especially when it rubs on his shirts.  It has not changed in any other way, and it does not hurt or bleed.  Lastly, he notes recent pimple breakouts on his chest.  He denies any other new, changing, or concerning skin lesions since his last visit; no bleeding, itching, or burning lesions.      Review of Systems:  No other skin or systemic complaints other than what is documented elsewhere in the note.    The following portions of the chart were reviewed this encounter and updated as appropriate:       Skin Cancer History  Biopsy Log Book  No skin cancers from Specimen Tracking.    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 Darrell Lancaster on 6/18/21 s/p Mohs surgery by Dr. Zuniga on 8/17/21   - reported history of BCC on left lower leg diagnosed by Dr. Lancaster on 8/17/22 s/p radiation therapy by Dr. Lancaster in September and October 2022, according to the patient  - AKs   - folliculitis  - no h/o melanoma    Family History:    No family history of melanoma or skin cancer    Social History:    The patient states he is retired from working as an  for [employer] and lived in [place], [place]; he states he had his 1st great-grandchild, a boy, Gigi, in March 2025    Allergies:  Tree nuts, Sulfamethoxazole-trimethoprim, Acetaminophen, Amlodipine, Ciprofloxacin, Codeine, Morphine, Oxycodone-acetaminophen, Propoxyphene, Propoxyphene " n-acetaminophen, Banana, Lactose, Levofloxacin, Red wine extract, and Squash    Current Medications / CAM's:  Current Medications[1]     Objective   Well appearing patient in no apparent distress; mood and affect are within normal limits.    A full examination was performed including scalp, face, eyes, ears, nose, lips, neck, chest, axillae, abdomen, back, bilateral upper extremities, and bilateral lower extremities. All findings within normal limits unless otherwise noted below.    Assessment/Plan   Skin Exam  1. NEOPLASM OF UNCERTAIN BEHAVIOR OF SKIN  Right Anterior Medial Proximal Leg    Shave removal    Lesion length (cm):  0.5  Margin per side (cm):  0.2  Lesion diameter (cm):  0.9  Informed consent: discussed and consent obtained    Timeout: patient name, date of birth, surgical site, and procedure verified    Procedure prep:  Patient was prepped and draped  Anesthesia: the lesion was anesthetized in a standard fashion    Anesthetic:  1% lidocaine w/ epinephrine 1-100,000 local infiltration  Instrument used: flexible razor blade    Hemostasis achieved with: aluminum chloride    Outcome: patient tolerated procedure well    Post-procedure details: sterile dressing applied and wound care instructions given    Dressing type: bandage and petrolatum      Staff Communication: Dermatology Local Anesthesia: 1 % Lidocaine / Epinephrine - Amount:0.5ml  Specimen 1 - Dermatopathology- DERM LAB  Differential Diagnosis: DN  Check Margins Yes/No?:    Comments:    Dermpath Lab: Routine Histopathology (formalin-fixed tissue)  2. INFLAMED SEBORRHEIC KERATOSIS  Head - Anterior (Face)  On the patient's left lateral frontal hairline, there is a 1 cm erythematous and light brown-colored, hyperkeratotic, stuck-on appearing papule with a surrounding rim of erythema  Inflamed Seborrheic Keratosis -left lateral frontal hairline.  The benign nature of this lesion was discussed with the patient today and reassurance provided.  Given the  history the patient provides of frequent irritation and associated symptoms as well as its inflamed appearance on exam today, I offered to treat this lesion with liquid nitrogen cryotherapy.  The patient expressed understanding, is in agreement with this plan, and wishes to proceed with cryotherapy today.  Destr of lesion - Head - Anterior (Face)  Complexity: simple    Destruction method: cryotherapy    Informed consent: discussed and consent obtained    Lesion destroyed using liquid nitrogen: Yes    Cryotherapy cycles:  2  Outcome: patient tolerated procedure well with no complications    Post-procedure details: wound care instructions given    3. SKIN TAG  Neck - Anterior  On the patient's left lateral inferior neck, there is a 6 mm erythematous and flesh-colored, soft, fleshy, pedunculated papule with a surrounding rim of erythema  Inflamed Acrochordon -left lateral inferior neck.  The benign nature of this skin tag was discussed with the patient today and reassurance provided.  Given the history the patient provides of frequent irritation and associated symptoms as well as its inflamed appearance on exam today, I offered to treat this lesion with liquid nitrogen cryotherapy.  The patient expressed understanding, is in agreement with this plan, and wishes to proceed with cryotherapy today.  Destr of lesion - Neck - Anterior  Complexity: simple    Destruction method: cryotherapy    Informed consent: discussed and consent obtained    Lesion destroyed using liquid nitrogen: Yes    Cryotherapy cycles:  2  Outcome: patient tolerated procedure well with no complications    Post-procedure details: wound care instructions given    4. ACTINIC KERATOSIS (16)  Head - Anterior (Face) (16)  Scattered on the patient's face, there are multiple erythematous, gritty, scaly macules   Actinic Keratoses - scattered on face.  The pre-cancerous nature of these lesions and treatment options were discussed with the patient today.  At  this time, I recommend treatment with liquid nitrogen cryotherapy.  The patient expressed understanding, is in agreement with this plan, and wishes to proceed with cryotherapy today.  Destr of lesion - Head - Anterior (Face) (16)  Complexity: simple    Destruction method: cryotherapy    Informed consent: discussed and consent obtained    Lesion destroyed using liquid nitrogen: Yes    Cryotherapy cycles:  1  Outcome: patient tolerated procedure well with no complications    Post-procedure details: wound care instructions given    5. MELANOCYTIC NEVUS OF TRUNK  Generalized  Scattered on the patient's face, neck, trunk, and extremities, there are multiple small, round- to oval-shaped, brown-pigmented and pink-colored, symmetric, uniform-appearing macules and dome-shaped papules  Clinically benign- to slightly atypical-appearing nevi - the clinically benign- to slightly atypical-appearing nature of the remainder of the patient's nevi was discussed with the patient today.  None of the patient's nevi, with the exception of the one noted above, meet threshold for biopsy today.  I emphasized the importance of performing monthly self-skin exams using the ABCDs of monitoring moles, which were reviewed with the patient today and an informational hand-out provided.  I also emphasized the importance of sun avoidance and sun protection with daily sunscreen use.  6. SEBORRHEIC KERATOSIS  Generalized  Scattered on the patient's face, neck, trunk, and extremities, there are multiple tan- to light brown-colored, hyperkeratotic, stuck-on appearing papules of varying size and shape  Seborrheic Keratoses - the benign nature of these lesions was discussed with the patient today and reassurance provided.  No treatment is medically indicated for the non-inflamed SKs at this time.  7. HEMANGIOMA OF SKIN  Generalized  Scattered on the patient's face, neck, trunk, and extremities, there are multiple small, round, cherry red- to  purplish-colored, symmetric, uniform, vascular-appearing macules and papules  Cherry Angiomas - the benign nature of these vascular lesions was discussed with the patient today and reassurance provided.  No treatment is medically indicated for these lesions at this time.  8. FOLLICULITIS  Left Abdomen (side) - Upper  Scattered on the patient's chest, there are several follicular-based erythematous, inflammatory papules and pustules  Folliculitis -flare on chest.  The bacterial nature of this condition and treatment options were discussed with the patient today.  At this time, I recommend topical antibiotic therapy with Clindamycin 1% lotion, which the patient was instructed to apply twice daily to the affected areas or up to 3-4 times per day as needed for active lesions.  The risks, benefits, and side effects of this medication were discussed.  The patient expressed understanding and is in agreement with this plan.  clindamycin (Cleocin T) 1 % lotion - Left Abdomen (side) - Upper  Apply topically 2 times a day.  9. HISTORY OF NONMELANOMA SKIN CANCER  Generalized  On the patient's inferior cutaneous lip lt and left lower leg, there are well-healed scars with no evidence of recurrent growth on exam today.  History of basal cell carcinomas and actinic keratoses and photodamage.  There is no evidence of recurrence on exam today.  The signs and symptoms of skin cancer were reviewed and the patient was advised to practice sun protection and sun avoidance, use daily sunscreen, and perform regular self skin exams.  I will have the patient return to our office in 4-6 months, pending the above biopsy result, for routine follow-up and skin exam, and the patient was instructed to call our office should the patient notice any new, changing, symptomatic, or otherwise concerning skin lesions before then.  The patient expressed understanding and is in agreement with this plan.  10. DIFFUSE PHOTODAMAGE OF SKIN  Generalized  Diffuse  photodamage with actinic changes with telangiectasia and mottled pigmentation in sun-exposed areas.  Photodamage.  The signs and symptoms of skin cancer were reviewed and the patient was advised to practice sun protection and sun avoidance, use daily sunscreen, and perform regular self skin exams.  Sun protection was discussed, including avoiding the mid-day sun, wearing a sunscreen with SPF at least 50, and stressing the need for reapplication of sunscreen and applying more than they think they need.          [1]   Current Outpatient Medications:     amLODIPine (Norvasc) 5 mg tablet, , Disp: , Rfl:     ascorbic acid (Vitamin C) 500 mg tablet, Take by mouth., Disp: , Rfl:     aspirin 81 mg EC tablet, Take 1 tablet (81 mg) by mouth every other day., Disp: , Rfl:     chlorthalidone (Hygroton) 25 mg tablet, Take 0.5 tablets (12.5 mg) by mouth once daily., Disp: , Rfl:     cholecalciferol (Vitamin D-3) 50 mcg (2,000 unit) capsule, Take 2 capsules (4,000 Units) by mouth once daily., Disp: , Rfl:     famotidine (Pepcid) 10 mg tablet, Take by mouth., Disp: , Rfl:     fluticasone (Flonase) 50 mcg/actuation nasal spray, SHAKE LIQUID AND USE 2 SPRAYS IN EACH NOSTRIL DAILY AT BEDTIME, Disp: , Rfl:     folic acid (Folvite) 1 mg tablet, TAKE 1 TABLET BY MOUTH ONCE DAILY. NEEDS NEW PCP FOR FURTHER REFILLS, Disp: , Rfl:     gentamicin (Garamycin) 0.1 % cream, Apply topically., Disp: , Rfl:     LACTOBACILLUS RHAMNOSUS GG ORAL, Take by mouth., Disp: , Rfl:     loratadine (Claritin) 10 mg tablet, Take 1 tablet (10 mg) by mouth 2 times a day., Disp: , Rfl:     metoprolol tartrate (Lopressor) 50 mg tablet, , Disp: , Rfl:     omeprazole (PriLOSEC) 20 mg DR capsule, Take 1 capsule (20 mg) by mouth 2 times a day., Disp: , Rfl:     ondansetron (Zofran) 4 mg tablet, Take 1 tablet (4 mg) by mouth every 8 hours if needed., Disp: , Rfl:     polyethylene glycol (Glycolax, Miralax) 17 gram/dose powder, Take by mouth., Disp: , Rfl:     potassium  chloride CR 20 mEq ER tablet, Take 1 tablet (20 mEq) by mouth twice a day., Disp: , Rfl:     prednisoLONE acetate (Pred-Forte) 1 % ophthalmic suspension, Administer 1 drop into affected eye(s) twice a day., Disp: , Rfl:     psyllium (Metamucil) 3.4 gram packet, Take 3 capsules by mouth once daily., Disp: , Rfl:     psyllium husk-calcium (Metamucil Plus Calcium) 1-60 gram-mg capsule, Take by mouth., Disp: , Rfl:     ramipril (Altace) 10 mg capsule, , Disp: , Rfl:     rosuvastatin (Crestor) 40 mg tablet, Take 1 tablet (40 mg) by mouth once daily., Disp: , Rfl:     triamcinolone (Kenalog) 0.1 % cream, Apply topically., Disp: , Rfl:     vitamin D3-vitamin K2 1,250-200 mcg capsule, Take by mouth., Disp: , Rfl:     clindamycin (Cleocin T) 1 % lotion, Apply topically 2 times a day., Disp: 60 mL, Rfl: 11

## 2025-05-29 NOTE — Clinical Note
On the patient's inferior cutaneous lip lt and left lower leg, there are well-healed scars with no evidence of recurrent growth on exam today.

## 2025-05-29 NOTE — Clinical Note
Inflamed Seborrheic Keratosis -left lateral frontal hairline.  The benign nature of this lesion was discussed with the patient today and reassurance provided.  Given the history the patient provides of frequent irritation and associated symptoms as well as its inflamed appearance on exam today, I offered to treat this lesion with liquid nitrogen cryotherapy.  The patient expressed understanding, is in agreement with this plan, and wishes to proceed with cryotherapy today.

## 2025-05-29 NOTE — Clinical Note
Inflamed Acrochordon -left lateral inferior neck.  The benign nature of this skin tag was discussed with the patient today and reassurance provided.  Given the history the patient provides of frequent irritation and associated symptoms as well as its inflamed appearance on exam today, I offered to treat this lesion with liquid nitrogen cryotherapy.  The patient expressed understanding, is in agreement with this plan, and wishes to proceed with cryotherapy today.

## 2025-05-29 NOTE — Clinical Note
Folliculitis -flare on chest.  The bacterial nature of this condition and treatment options were discussed with the patient today.  At this time, I recommend topical antibiotic therapy with Clindamycin 1% lotion, which the patient was instructed to apply twice daily to the affected areas or up to 3-4 times per day as needed for active lesions.  The risks, benefits, and side effects of this medication were discussed.  The patient expressed understanding and is in agreement with this plan.

## 2025-05-29 NOTE — Clinical Note
On the patient's left lateral inferior neck, there is a 6 mm erythematous and flesh-colored, soft, fleshy, pedunculated papule with a surrounding rim of erythema

## 2025-05-29 NOTE — Clinical Note
Seborrheic Keratoses - the benign nature of these lesions was discussed with the patient today and reassurance provided.  No treatment is medically indicated for the non-inflamed SKs at this time.

## 2025-05-29 NOTE — Clinical Note
On the patient's left lateral frontal hairline, there is a 1 cm erythematous and light brown-colored, hyperkeratotic, stuck-on appearing papule with a surrounding rim of erythema

## 2025-12-08 ENCOUNTER — APPOINTMENT (OUTPATIENT)
Dept: DERMATOLOGY | Facility: CLINIC | Age: 85
End: 2025-12-08
Payer: MEDICARE